# Patient Record
Sex: MALE | Race: WHITE | HISPANIC OR LATINO | Employment: OTHER | URBAN - METROPOLITAN AREA
[De-identification: names, ages, dates, MRNs, and addresses within clinical notes are randomized per-mention and may not be internally consistent; named-entity substitution may affect disease eponyms.]

---

## 2023-04-27 ENCOUNTER — HOSPITAL ENCOUNTER (OUTPATIENT)
Dept: DATA CONVERSION | Facility: HOSPITAL | Age: 68
End: 2023-04-27
Attending: STUDENT IN AN ORGANIZED HEALTH CARE EDUCATION/TRAINING PROGRAM | Admitting: STUDENT IN AN ORGANIZED HEALTH CARE EDUCATION/TRAINING PROGRAM
Payer: MEDICARE

## 2023-04-27 DIAGNOSIS — E78.5 HYPERLIPIDEMIA, UNSPECIFIED: ICD-10-CM

## 2023-04-27 DIAGNOSIS — R97.20 ELEVATED PROSTATE SPECIFIC ANTIGEN (PSA): ICD-10-CM

## 2023-04-27 DIAGNOSIS — Z86.16 PERSONAL HISTORY OF COVID-19: ICD-10-CM

## 2023-04-27 DIAGNOSIS — I10 ESSENTIAL (PRIMARY) HYPERTENSION: ICD-10-CM

## 2023-04-27 DIAGNOSIS — R80.9 PROTEINURIA, UNSPECIFIED: ICD-10-CM

## 2023-04-27 DIAGNOSIS — E66.9 OBESITY, UNSPECIFIED: ICD-10-CM

## 2023-04-27 DIAGNOSIS — N40.1 BENIGN PROSTATIC HYPERPLASIA WITH LOWER URINARY TRACT SYMPTOMS: ICD-10-CM

## 2023-04-27 DIAGNOSIS — R35.0 FREQUENCY OF MICTURITION: ICD-10-CM

## 2023-04-27 DIAGNOSIS — N52.9 MALE ERECTILE DYSFUNCTION, UNSPECIFIED: ICD-10-CM

## 2023-04-27 DIAGNOSIS — F41.9 ANXIETY DISORDER, UNSPECIFIED: ICD-10-CM

## 2023-04-27 DIAGNOSIS — C61 MALIGNANT NEOPLASM OF PROSTATE (MULTI): ICD-10-CM

## 2023-05-01 ENCOUNTER — OFFICE VISIT (OUTPATIENT)
Dept: PRIMARY CARE | Facility: CLINIC | Age: 68
End: 2023-05-01
Payer: MEDICARE

## 2023-05-01 VITALS
SYSTOLIC BLOOD PRESSURE: 140 MMHG | HEIGHT: 68 IN | HEART RATE: 97 BPM | WEIGHT: 217 LBS | BODY MASS INDEX: 32.89 KG/M2 | DIASTOLIC BLOOD PRESSURE: 78 MMHG | OXYGEN SATURATION: 97 % | TEMPERATURE: 97.8 F

## 2023-05-01 DIAGNOSIS — I10 PRIMARY HYPERTENSION: Primary | ICD-10-CM

## 2023-05-01 DIAGNOSIS — L85.3 DRY SKIN: ICD-10-CM

## 2023-05-01 DIAGNOSIS — N40.1 BENIGN PROSTATIC HYPERPLASIA WITH URINARY FREQUENCY: ICD-10-CM

## 2023-05-01 DIAGNOSIS — R35.0 BENIGN PROSTATIC HYPERPLASIA WITH URINARY FREQUENCY: ICD-10-CM

## 2023-05-01 DIAGNOSIS — Z12.11 ENCOUNTER FOR SCREENING FOR MALIGNANT NEOPLASM OF COLON: ICD-10-CM

## 2023-05-01 PROCEDURE — 3008F BODY MASS INDEX DOCD: CPT | Performed by: FAMILY MEDICINE

## 2023-05-01 PROCEDURE — 3078F DIAST BP <80 MM HG: CPT | Performed by: FAMILY MEDICINE

## 2023-05-01 PROCEDURE — 99387 INIT PM E/M NEW PAT 65+ YRS: CPT | Performed by: FAMILY MEDICINE

## 2023-05-01 PROCEDURE — 3077F SYST BP >= 140 MM HG: CPT | Performed by: FAMILY MEDICINE

## 2023-05-01 PROCEDURE — 1036F TOBACCO NON-USER: CPT | Performed by: FAMILY MEDICINE

## 2023-05-01 RX ORDER — LOSARTAN POTASSIUM 100 MG/1
100 TABLET ORAL DAILY
COMMUNITY
Start: 2020-12-04 | End: 2023-11-01 | Stop reason: SDUPTHER

## 2023-05-01 RX ORDER — PREDNISONE 20 MG/1
40 TABLET ORAL DAILY PRN
Qty: 20 TABLET | Refills: 0 | Status: SHIPPED | OUTPATIENT
Start: 2023-05-01 | End: 2023-05-06

## 2023-05-01 RX ORDER — TADALAFIL 20 MG/1
20 TABLET ORAL DAILY PRN
COMMUNITY
Start: 2017-05-24 | End: 2023-11-01 | Stop reason: SDUPTHER

## 2023-05-01 RX ORDER — TAMSULOSIN HYDROCHLORIDE 0.4 MG/1
0.4 CAPSULE ORAL DAILY
Qty: 90 CAPSULE | Refills: 1 | Status: SHIPPED | OUTPATIENT
Start: 2023-05-01 | End: 2023-11-01 | Stop reason: SDUPTHER

## 2023-05-01 RX ORDER — DRESSING,ODOR ABSORBENT,H-POLY 4" X 4"
1 BANDAGE MISCELLANEOUS DAILY
Qty: 30 EACH | Refills: 2 | Status: SHIPPED
Start: 2023-05-01 | End: 2023-11-01 | Stop reason: ALTCHOICE

## 2023-05-01 RX ORDER — TAMSULOSIN HYDROCHLORIDE 0.4 MG/1
0.4 CAPSULE ORAL DAILY
COMMUNITY
Start: 2020-04-29 | End: 2023-11-01 | Stop reason: SDUPTHER

## 2023-05-01 RX ORDER — BUTYROSPERMUM PARKII(SHEA BUTTER), SIMMONDSIA CHINENSIS (JOJOBA) SEED OIL, ALOE BARBADENSIS LEAF EXTRACT .01; 1; 3.5 G/100G; G/100G; G/100G
250 LIQUID TOPICAL 2 TIMES DAILY
COMMUNITY

## 2023-05-01 RX ORDER — LOSARTAN POTASSIUM 100 MG/1
100 TABLET ORAL DAILY
Qty: 90 TABLET | Refills: 1 | Status: SHIPPED
Start: 2023-05-01 | End: 2023-06-21 | Stop reason: ALTCHOICE

## 2023-05-01 NOTE — PROGRESS NOTES
Subjective   Patient ID: Chacho Vences is a 68 y.o. male who presents for New Patient Visit and Annual Exam.  HPI     Current Outpatient Medications on File Prior to Visit   Medication Sig Dispense Refill    losartan (Cozaar) 100 mg tablet Take 1 tablet (100 mg) by mouth once daily.      saccharomyces boulardii (Florastor) 250 mg capsule Take 1 capsule (250 mg) by mouth 2 times a day.      tamsulosin (Flomax) 0.4 mg 24 hr capsule Take 1 capsule (0.4 mg) by mouth once daily.      tadalafil (Cialis) 20 mg tablet Take 1 tablet (20 mg) by mouth once daily as needed for erectile dysfunction.       No current facility-administered medications on file prior to visit.        Vitals:    05/01/23 1442   BP: 140/78   Pulse: 97   Temp: 36.6 °C (97.8 °F)   SpO2: 97%       Review of Systems    Objective     Physical Exam    Legacy Encounter on 03/09/2023   Component Date Value Ref Range Status    Glucose 03/09/2023 104 (H)  74 - 99 mg/dL Final    Sodium 03/09/2023 139  136 - 145 mmol/L Final    Potassium 03/09/2023 3.8  3.5 - 5.3 mmol/L Final    Chloride 03/09/2023 106  98 - 107 mmol/L Final    Bicarbonate 03/09/2023 26  21 - 32 mmol/L Final    Anion Gap 03/09/2023 11  10 - 20 mmol/L Final    Urea Nitrogen 03/09/2023 13  6 - 23 mg/dL Final    Creatinine 03/09/2023 1.07  0.50 - 1.30 mg/dL Final    GFR MALE 03/09/2023 76  >90 mL/min/1.73m2 Final    Comment:  CALCULATIONS OF ESTIMATED GFR ARE PERFORMED   USING THE 2021 CKD-EPI STUDY REFIT EQUATION   WITHOUT THE RACE VARIABLE FOR THE IDMS-TRACEABLE   CREATININE METHODS.    https://jasn.asnjournals.org/content/early/2021/09/22/ASN.8376442539      Calcium 03/09/2023 8.9  8.6 - 10.3 mg/dL Final    WBC 03/09/2023 6.4  4.4 - 11.3 x10E9/L Final    RBC 03/09/2023 4.66  4.50 - 5.90 x10E12/L Final    Hemoglobin 03/09/2023 14.7  13.5 - 17.5 g/dL Final    Hematocrit 03/09/2023 42.4  41.0 - 52.0 % Final    MCV 03/09/2023 91  80 - 100 fL Final    MCHC 03/09/2023 34.7  32.0 - 36.0 g/dL Final     Platelets 03/09/2023 154  150 - 450 x10E9/L Final    RDW 03/09/2023 13.2  11.5 - 14.5 % Final    Pathology Report 03/09/2023    Final                    Value:Name GENE CHEATHAM                                                                                                   Accession #: [id number]            Pathologist:                   THANG BARNETT DO  Date of Procedure:    3/9/2023  Date Received:          3/9/2023  Date Reported           3/17/2023  Submitting Physician:   AGUEDA ROSENTHAL  Location:                    [facility]  Other External #     Procedures/Addenda Present                                                               FINAL DIAGNOSIS  A.  PROSTATE, RIGHT BASE, BIOPSY:    - PROSTATIC ADENOCARCINOMA, LISA SCORE 6 (3+3), GRADE GROUP 1, INVOLVING    1 OF 1 CORES AND APPROXIMATELY 5% OF SUBMITTED TISSUE    B.  PROSTATE, RIGHT LATERAL BASE, BIOPSY:  - HIGH-GRADE PROSTATIC INTRAEPITHELIAL NEOPLASIA    C.  PROSTATE, RIGHT MID, BIOPSY:    - ATYPICAL SMALL ACINAR PROLIFERATION, SUSPICIOUS FOR ADENOCARCINOMA    D.  PROSTATE, RIGHT LATERAL MID, BIOPSY:    - BENIGN PROSTATIC TISSUE    E.  PROSTATE, RIGHT APEX, BIOPSY:                              - BENIGN PROSTATIC TISSUE    F.  PROSTATE, RIGHT LATERAL APEX, BIOPSY:    - BENIGN PROSTATIC TISSUE    G.  PROSTATE, LEFT BASE, BIOPSY:    - PROSTATIC ADENOCARCINOMA, LISA SCORE 7 (3+4), GRADE GROUP 2, INVOLVING    1 OF 1 CORES AND APPROXIMATELY 35% OF SUBMITTED TISSUE    - PERCENTAGE OF LISA PATTERN 4: 5%  - POSITIVE FOR PERINEURAL INVASION    H.  PROSTATE, LEFT LATERAL BASE, BIOPSY:    - PROSTATIC ADENOCARCINOMA, LISA SCORE 6 (3+3), GRADE GROUP 1, INVOLVING    1 OF 1 CORES AND APPROXIMATELY 10% OF SUBMITTED TISSUE  - POSITIVE FOR PERINEURAL INVASION    I.  PROSTATE, LEFT MID, BIOPSY:    - PROSTATIC ADENOCARCINOMA, LISA SCORE 6 (3+3), GRADE GROUP 1, INVOLVING    1 OF 1 CORES AND APPROXIMATELY 40% OF SUBMITTED TISSUE  - POSITIVE FOR  PERINEURAL INVASION    J.  PROSTATE, LEFT LATERAL MID, BIOPSY:   - PROSTATIC ADENOCARCINOMA, LISA SCORE 6 (3+3), GRADE GROUP 1, INVOLVING    1 OF 1 CORES AND APPROXIMATELY 40% OF SUBMITTED TISSUE     K.  PROSTATE, LEFT APEX, BIOPSY:    - UT                          OSTATIC ADENOCARCINOMA, LISA SCORE 6 (3+3), GRADE GROUP 1, INVOLVING    1 OF 1 CORES AND APPROXIMATELY LESS THAN 5% OF SUBMITTED TISSUE    L.  PROSTATE, LEFT LATERAL APEX, BIOPSY:    - PROSTATIC ADENOCARCINOMA, LISA SCORE 7 (3+4), GRADE GROUP 2, INVOLVING    1 OF 1 CORES AND APPROXIMATELY 20% OF SUBMITTED TISSUE    - PERCENTAGE OF LISA PATTERN 4: 15%    M.  PROSTATE, REGION OF INTEREST #1, BIOPSY:    - PROSTATIC ADENOCARCINOMA, LISA SCORE 7 (3+4), GRADE GROUP 2, INVOLVING    3 OF 3 CORES AND APPROXIMATELY 40% OF SUBMITTED TISSUE    - PERCENTAGE OF LISA PATTERN 4: 20%    Note: Immunostain cocktail PIN4 is consistent with the above diagnosis.    N.  PROSTATE, REGION OF INTEREST #2, BIOPSY:    - PROSTATIC ADENOCARCINOMA, LISA SCORE 6 (3+3), GRADE GROUP 1, INVOLVING    2 OF 4 CORES AND APPROXIMATELY 5% OF SUBMITTED TISSUE    Comment A-N: No intraductal carcinoma or cribriform pattern identified.                                                                                                                                                                                                                                                                                                                                                                                                                                                                                                                 Electronically Signed Out By THANG BARNETT DO/EFRAIN  By the signature on this report, the individual or group listed as making the  Final Interpretation/Diagnosis certifies that they have reviewed this case.  Diagnostic interpretation performed at [facility]  [facility] [address] [place] [address]           Addendum/Procedures:  Special Oncology Report With Image     Date Ordered:     4/20/2023     Status:   Signed Out       Date Complete:     4/20/2023             Date Reported:     4/20/2023                  Addendum Diagnosis  A complete  Decipher Genomic Risk test result issued by ([employer]) is on file in the Department of Anatomic Pathology at [facility].    Genomic Risk is: LOW           Electronically Signed Out By THANG BARNETT DO/EFRAIN  By the signature on this report, the individual or group listed as making the  Final Interpretation/Diagnosis certifies that they have reviewed this case.  Addendum signed out at [facility] [address] [place] [address].  Outside Special Oncology Report  [IMAGE:attachments:c6:1]         Clinical History:  Physician Contact Number: [id number]  Fixative (A): Formalin  Site Loc (B): Lateral  Fixative (B): Formalin  Site Loc (C): Medial  Fixative (C): Formalin  Site Loc (D): Lateral  Fixative (D): Formalin  Fixative (E): Formalin  Site Loc (F): Lateral  Fixative (F): Formalin  Fixative (G): Formalin  Site Loc (H): Lateral  Fixative (H): Formalin  Fixative (I): Formalin  Fixative (J): Formalin  Fixative (K): Formalin  Fixative (L): Formalin  Fixative (M): Formalin  Fixative (                          N): Formalin  Ischemic Time (A): 07:40  Ischemic Time (B): 07:40  Ischemic Time (C): 07:40  Fixative Time (A): 07:40  Fixative Time (B): 07:40  Fixative Time (C): 07:40  Ischemic Time (D): 07:41  Fixative Time (D): 07:41  Ischemic Time (E): 07:41  Fixative Time (E): 07:42  Ischemic Time (F): 07:42  Fixative Time (F): 07:42  Ischemic Time (G): 07:42  Fixative Time (G): 07:43  Ischemic Time (H): 07:43  Fixative Time (H): 07:43  Ischemic Time (I): 07:43  Fixative Time (I): 07:43  Ischemic Time (J): 07:43  Fixative Time (J): 07:43  Ischemic Time (K):  "07:43  Fixative Time (K): 07:43  Ischemic Time (L): 07:44  Fixative Time (L): 07:44  Ischemic Time (M): 07:44  Fixative Time (M): 07:44  Ischemic Time (N): 07:44  Fixative Time (N): 07:44  Clinical Diagnosis History Patient presents with above. Here today for prostate  biopsy.    Specimens Submitted As:  A: RIGHT BASE   B: RIGHT LATERAL BASE   C: RIGHT MID   D: RIGHT LATERAL MID   E: RIGHT APEX   F: RIGHT LATERAL APEX   G: LEFTBASE   H: LEFT L                          ATERAL BASE   I: LEFT MID   J: LEFT LATERAL MID   K: LEFT APEX   L: LEFT LATERAL APEX   M: REGION OF INTEREST #1   N: REGION OF INTEREST #2     Gross Description:  A:  Received in formalin, labeled with the patient's name and hospital number  and \"right base\", is one cylindrical segment of tan soft tissue measuring 1.5  cm in length by less than 0.1 cm in diameter. The specimen is submitted in toto  in one cassette.   MKM    B:  Received in formalin, labeled with the patient's name and hospital number  and \"right lateral base\", is one cylindrical segment of tan soft tissue  measuring 1.0 cm in length by less than 0.1 cm in diameter. The specimen is  submitted in toto in one cassette.   MKM    C:  Received in formalin, labeled with the patient's name and hospital number  and \"right mid\", is one cylindrical segment of tan soft tissue measuring 1.6 cm  in length by less than 0.1 cm in diameter. The specimen is submitted in toto in  one cassette.   MKM    D:  Received in formalin, labeled                           with the patient's name and hospital number  and \"right lateral mid\", is one cylindrical segment of tan soft tissue  measuring 1.4 cm in length by less than 0.1 cm in diameter. The specimen is  submitted in toto in one cassette.   MKM    E:  Received in formalin, labeled with the patient's name and hospital number  and \"right apex\", is one cylindrical segment of tan soft tissue measuring 1.6  cm in length by less than 0.1 cm in diameter. The " "specimen is submitted in toto  in one cassette.   MKM    F:  Received in formalin, labeled with the patient's name and hospital number  and \"right lateral apex\", is one cylindrical segment of tan soft tissue  measuring 1.3 cm in length by less than 0.1 cm in diameter. The specimen is  submitted in toto in one cassette.   MKM    G:  Received in formalin, labeled with the patient's name and hospital number  and \"left base\", is one cylindrical segment of tan soft tissue measuring 1.5 cm  in length by less than 0.1 cm in diameter. The specimen is submi                          tted in toto in  one cassette.   MKM    H:  Received in formalin, labeled with the patient's name and hospital number  and \"left lateral base\", is one cylindrical segment of tan soft tissue  measuring 1.3 cm in length by less than 0.1 cm in diameter. The specimen is  submitted in toto in one cassette.   MKM    I:  Received in formalin, labeled with the patient's name and hospital number  and \"left mid\", is one cylindrical segment of tan soft tissue measuring 1.4 cm  in length by less than 0.1 cm in diameter. The specimen is submitted in toto in  one cassette.   MKM    J:  Received in formalin, labeled with the patient's name and hospital number  and \"left lateral mid\", is one cylindrical segment of tan soft tissue measuring  1.3 cm in length by less than 0.1 cm in diameter. The specimen is submitted in  toto in one cassette.   MKM    K:  Received in formalin, labeled with the patient's name and hospital number  and \"left apex\", is one cylindrical segment of tan soft tissue measuring 1.                          3 cm  in length by less than 0.1 cm in diameter. The specimen is submitted in toto in  one cassette.   MKM    L:  Received in formalin, labeled with the patient's name and hospital number  and \"left lateral apex\", is one cylindrical segment of tan soft tissue,  measuring, 1.4 cm in length by less than 0.1 cm in diameter. The specimen " "is  submitted in toto in one cassette.   MKM    M: Received in formalin, labeled with the patient's name and hospital number  and \"region of interest 1\", are multiple cylindrical segments of tan soft  tissue ranging from 1.4 cm to 1.6 cm in length by less than 0.1 cm in diameter.  The specimen is submitted in toto in one cassette.   MKM    N: Received in formalin, labeled with the patient's name and hospital number  and \"region of interest 2\", are multiple cylindrical segments of tan soft  tissue ranging from 0.6 cm to 1.3 cm in length by less than 0.1 cm in diameter.  The specimen is submitted in toto in one cassette.   [facility]/3/10/2023           The                           assays/tests were performed with appropriate positive and negative controls  which stained appropriately.    [facility]  Department of Pathology   [address] [place] [address]        Ventricular Rate 03/09/2023 67  BPM Final    Atrial Rate 03/09/2023 67  BPM Final    CT Interval 03/09/2023 157  ms Final    QRS Duration 03/09/2023 90  ms Final    QT Interval 03/09/2023 417  ms Final    QTC Calculation(Bazett) 03/09/2023 441  ms Final    P Axis 03/09/2023 38  degrees Final    R Axis 03/09/2023 6  degrees Final    T Axis 03/09/2023 27  degrees Final    QRS Count 03/09/2023 10  beats Final    Q Onset 03/09/2023 249  ms Final    T Offset 03/09/2023 458  ms Final    QTC Fredericia 03/09/2023 432  ms Final       Assessment/Plan     "

## 2023-05-09 LAB — NONINV COLON CA DNA+OCC BLD SCRN STL QL: NORMAL

## 2023-05-24 ENCOUNTER — TELEPHONE (OUTPATIENT)
Dept: PRIMARY CARE | Facility: CLINIC | Age: 68
End: 2023-05-24
Payer: MEDICARE

## 2023-05-24 LAB — NONINV COLON CA DNA+OCC BLD SCRN STL QL: NEGATIVE

## 2023-06-20 ENCOUNTER — TELEPHONE (OUTPATIENT)
Dept: PRIMARY CARE | Facility: CLINIC | Age: 68
End: 2023-06-20
Payer: MEDICARE

## 2023-06-20 DIAGNOSIS — I10 PRIMARY HYPERTENSION: Primary | ICD-10-CM

## 2023-06-20 NOTE — TELEPHONE ENCOUNTER
Patient is going through Radiation for Prostate CA his BP has been up 188/88,168/74,181/84,175/79,176/74 is taking Losartan 100 mg should he take 1 in the morning and 1 at night? Or what should he do?

## 2023-06-21 RX ORDER — HYDROCHLOROTHIAZIDE 25 MG/1
25 TABLET ORAL DAILY
Qty: 90 TABLET | Refills: 1 | Status: SHIPPED | OUTPATIENT
Start: 2023-06-21 | End: 2023-11-01 | Stop reason: SDUPTHER

## 2023-09-07 VITALS — HEIGHT: 68 IN | BODY MASS INDEX: 31.41 KG/M2 | WEIGHT: 207.23 LBS

## 2023-09-14 NOTE — H&P
History & Physical Reviewed:   I have reviewed the History and Physical dated:  29-Mar-2023   History and Physical reviewed and relevant findings noted. Patient examined to review pertinent physical  findings.: No significant changes   Home Medications Reviewed: no changes noted   Allergies Reviewed: no changes noted       ERAS (Enhanced Recovery After Surgery):  ·  ERAS Patient: no     Consent:   COVID-19 Consent:  ·  COVID-19 Risk Consent Surgeon has reviewed key risks related to the risk of elina COVID-19 and if they contract COVID-19 what the risks are.       Electronic Signatures:  Sergio Herring)  (Signed 27-Apr-2023 08:15)   Authored: History & Physical Reviewed, ERAS, Consent,  Note Completion      Last Updated: 27-Apr-2023 08:15 by Sergio Herring)

## 2023-10-02 NOTE — OP NOTE
PROCEDURE DETAILS    Preoperative Diagnosis:  Malignant neoplasm prostate, C61    Postoperative Diagnosis:  Malignant neoplasm prostate, C61    Surgeon: Sergio Herring  Resident/Fellow/Other Assistant: None of these were associated with this case    Procedure:  1. SPACEOAR AND FIDUCIALS    Anesthesia: No anesthesiologist associated with this case  Estimated Blood Loss: 0  Findings: 24.6 g prostate  SpaceOAR Julian gel placement  Fiducials placed left posterior apex, left anterior base, right mid gland  Specimens(s) Collected: no,     Complications: none immediate   Drains and/or Catheters: none  Implants: SpaceOAR JULIAN  Gold fiducial x3        Operative Report:   Patient was met in the preoperative holding area where informed consent was obtained.  Brought to the operative suite where MAC anesthesia was given.  Placed  in high dorsolithotomy position and prepped in standard surgical fashion.  Timeout performed and all parties in agreement.  Transrectal ultrasound probe inserted after digital rectal exam confirmed small prostate with no obvious nodule.  There were noninflamed  external hemorrhoids.  I placed gold fiducials under ultrasound guidance left anterior base, left posterior apex, right mid gland.  Then assembled SpaceOAR Julian kit and hydrodissected plane between anterior Denonvilliers and rectal wall with saline after  aspirating to confirm I was not intravascular.  10 mL space review injected into the space slowly over 20 seconds with good tissue separation under ultrasound guidance.  Needle was removed and procedure was terminated.  Patient taken recovery in stable  condition.    Plan:  Patient follow-up with radiation oncology for treatment planning                        Attestation:   Note Completion:  Attending Attestation I performed the procedure without a resident         Electronic Signatures:  Sergio Herring)  (Signed 27-Apr-2023 09:00)   Authored: Post-Operative Note, Chart Review, Note  Completion      Last Updated: 27-Apr-2023 09:00 by Sergio Herring)

## 2023-10-05 ENCOUNTER — LAB (OUTPATIENT)
Dept: LAB | Facility: LAB | Age: 68
End: 2023-10-05
Payer: MEDICARE

## 2023-10-05 DIAGNOSIS — I10 PRIMARY HYPERTENSION: ICD-10-CM

## 2023-10-05 DIAGNOSIS — C61 MALIGNANT NEOPLASM OF PROSTATE (MULTI): ICD-10-CM

## 2023-10-05 DIAGNOSIS — C61 MALIGNANT NEOPLASM OF PROSTATE (MULTI): Primary | ICD-10-CM

## 2023-10-05 LAB
ALBUMIN SERPL BCP-MCNC: 4.6 G/DL (ref 3.4–5)
ALP SERPL-CCNC: 70 U/L (ref 33–136)
ALT SERPL W P-5'-P-CCNC: 15 U/L (ref 10–52)
ANION GAP SERPL CALC-SCNC: 13 MMOL/L (ref 10–20)
AST SERPL W P-5'-P-CCNC: 18 U/L (ref 9–39)
BILIRUB SERPL-MCNC: 0.6 MG/DL (ref 0–1.2)
BUN SERPL-MCNC: 19 MG/DL (ref 6–23)
CALCIUM SERPL-MCNC: 9.6 MG/DL (ref 8.6–10.6)
CHLORIDE SERPL-SCNC: 103 MMOL/L (ref 98–107)
CHOLEST SERPL-MCNC: 172 MG/DL (ref 0–199)
CHOLESTEROL/HDL RATIO: 4.7
CO2 SERPL-SCNC: 29 MMOL/L (ref 21–32)
CREAT SERPL-MCNC: 1.09 MG/DL (ref 0.5–1.3)
GFR SERPL CREATININE-BSD FRML MDRD: 74 ML/MIN/1.73M*2
GLUCOSE SERPL-MCNC: 84 MG/DL (ref 74–99)
HDLC SERPL-MCNC: 36.4 MG/DL
LDLC SERPL CALC-MCNC: 112 MG/DL (ref 140–190)
NON HDL CHOLESTEROL: 136 MG/DL (ref 0–149)
POTASSIUM SERPL-SCNC: 3.9 MMOL/L (ref 3.5–5.3)
PROT SERPL-MCNC: 7.5 G/DL (ref 6.4–8.2)
PSA SERPL-MCNC: 0.44 NG/ML
SODIUM SERPL-SCNC: 141 MMOL/L (ref 136–145)
TRIGL SERPL-MCNC: 118 MG/DL (ref 0–149)
VLDL: 24 MG/DL (ref 0–40)

## 2023-10-05 PROCEDURE — 80053 COMPREHEN METABOLIC PANEL: CPT

## 2023-10-05 PROCEDURE — 36415 COLL VENOUS BLD VENIPUNCTURE: CPT

## 2023-10-05 PROCEDURE — 84153 ASSAY OF PSA TOTAL: CPT

## 2023-10-05 PROCEDURE — 80061 LIPID PANEL: CPT

## 2023-11-01 ENCOUNTER — OFFICE VISIT (OUTPATIENT)
Dept: PRIMARY CARE | Facility: CLINIC | Age: 68
End: 2023-11-01
Payer: MEDICARE

## 2023-11-01 VITALS
HEART RATE: 77 BPM | BODY MASS INDEX: 31.07 KG/M2 | OXYGEN SATURATION: 96 % | WEIGHT: 205 LBS | HEIGHT: 68 IN | DIASTOLIC BLOOD PRESSURE: 70 MMHG | TEMPERATURE: 97.3 F | SYSTOLIC BLOOD PRESSURE: 138 MMHG

## 2023-11-01 DIAGNOSIS — R35.0 BENIGN PROSTATIC HYPERPLASIA WITH URINARY FREQUENCY: ICD-10-CM

## 2023-11-01 DIAGNOSIS — N40.1 BENIGN PROSTATIC HYPERPLASIA WITH URINARY FREQUENCY: ICD-10-CM

## 2023-11-01 DIAGNOSIS — I10 PRIMARY HYPERTENSION: ICD-10-CM

## 2023-11-01 DIAGNOSIS — Z00.00 ROUTINE GENERAL MEDICAL EXAMINATION AT HEALTH CARE FACILITY: Primary | ICD-10-CM

## 2023-11-01 DIAGNOSIS — N52.9 ERECTILE DYSFUNCTION, UNSPECIFIED ERECTILE DYSFUNCTION TYPE: ICD-10-CM

## 2023-11-01 PROCEDURE — 3078F DIAST BP <80 MM HG: CPT | Performed by: FAMILY MEDICINE

## 2023-11-01 PROCEDURE — 1170F FXNL STATUS ASSESSED: CPT | Performed by: FAMILY MEDICINE

## 2023-11-01 PROCEDURE — 1159F MED LIST DOCD IN RCRD: CPT | Performed by: FAMILY MEDICINE

## 2023-11-01 PROCEDURE — 3008F BODY MASS INDEX DOCD: CPT | Performed by: FAMILY MEDICINE

## 2023-11-01 PROCEDURE — 1036F TOBACCO NON-USER: CPT | Performed by: FAMILY MEDICINE

## 2023-11-01 PROCEDURE — 3075F SYST BP GE 130 - 139MM HG: CPT | Performed by: FAMILY MEDICINE

## 2023-11-01 PROCEDURE — 99214 OFFICE O/P EST MOD 30 MIN: CPT | Performed by: FAMILY MEDICINE

## 2023-11-01 PROCEDURE — 1160F RVW MEDS BY RX/DR IN RCRD: CPT | Performed by: FAMILY MEDICINE

## 2023-11-01 PROCEDURE — G0439 PPPS, SUBSEQ VISIT: HCPCS | Performed by: FAMILY MEDICINE

## 2023-11-01 RX ORDER — TAMSULOSIN HYDROCHLORIDE 0.4 MG/1
0.4 CAPSULE ORAL DAILY
Qty: 90 CAPSULE | Refills: 2 | Status: SHIPPED
Start: 2023-11-01 | End: 2024-05-06 | Stop reason: SDUPTHER

## 2023-11-01 RX ORDER — TADALAFIL 20 MG/1
20 TABLET ORAL DAILY PRN
Qty: 30 TABLET | Refills: 1 | Status: SHIPPED | OUTPATIENT
Start: 2023-11-01 | End: 2024-01-29 | Stop reason: SDUPTHER

## 2023-11-01 RX ORDER — LOSARTAN POTASSIUM 100 MG/1
100 TABLET ORAL DAILY
Qty: 90 TABLET | Refills: 2 | Status: SHIPPED
Start: 2023-11-01 | End: 2024-05-06 | Stop reason: ALTCHOICE

## 2023-11-01 RX ORDER — HYDROCHLOROTHIAZIDE 25 MG/1
25 TABLET ORAL DAILY
Qty: 90 TABLET | Refills: 1 | Status: SHIPPED
Start: 2023-11-01 | End: 2024-05-06 | Stop reason: ALTCHOICE

## 2023-11-01 ASSESSMENT — ACTIVITIES OF DAILY LIVING (ADL)
DOING_HOUSEWORK: INDEPENDENT
TAKING_MEDICATION: INDEPENDENT
GROCERY_SHOPPING: NEEDS ASSISTANCE
DRESSING: INDEPENDENT
BATHING: INDEPENDENT
MANAGING_FINANCES: INDEPENDENT

## 2023-11-01 ASSESSMENT — PATIENT HEALTH QUESTIONNAIRE - PHQ9
2. FEELING DOWN, DEPRESSED OR HOPELESS: NOT AT ALL
1. LITTLE INTEREST OR PLEASURE IN DOING THINGS: NOT AT ALL
SUM OF ALL RESPONSES TO PHQ9 QUESTIONS 1 AND 2: 0

## 2023-11-01 NOTE — PROGRESS NOTES
"Subjective   Reason for Visit: Chacho Vences is an 68 y.o. male here for a Medicare Wellness visit.     Past Medical, Surgical, and Family History reviewed and updated in chart.    Reviewed all medications by prescribing practitioner or clinical pharmacist (such as prescriptions, OTCs, herbal therapies and supplements) and documented in the medical record.    HPI  HTN: well controlled  ED: needs refills of Cialis  Prostate CA: doing well after radiation.  PSA dropped significantly  BPH: doing well on flomax    Preparing meals - independent  Using telephone - independent  Bladder - continent  Bowel - continent    Recent hospital stays - none    ADLs - able to dress, walk and bath independently  Instrumental ADL's - able to shop, maintain finances, managing medications, housekeeping independently    Depression: PHQ-2 (screening 2 mins) - negative    Opioid use -   none  Exercise -  stays active  Diet - well balanced  Pain score - minimal    Providers    ANTWON dementia screen - given to pt    Education - educated pt on healthy eating, exercise, weight loss    Falls - no falls      Counseled pt on living will - has living will    AAA screening:  NA     Prostate CA screen:  treated for prostate CA    CV screening: CA score in 2018    Colonoscopy:  done cologuard in 23, due for 2026    Diabetes screening:  neg    Glaucoma screen:  recommended optho follow up    CT chest tob screen: NA    Vaccines:  got flu, doesn't plan to get COVID booster.  Had pneumonia shot.      Patient Care Team:  Farhat Jones MD as PCP - General (Family Medicine)     Review of Systems   All other systems reviewed and are negative.      Objective   Vitals:  /70   Pulse 77   Temp 36.3 °C (97.3 °F)   Ht 1.721 m (5' 7.75\")   Wt 93 kg (205 lb)   SpO2 96%   BMI 31.40 kg/m²       Physical Exam  Vitals reviewed.   Constitutional:       General: He is not in acute distress.     Appearance: Normal appearance. He is well-developed. He is " not diaphoretic.   HENT:      Head: Normocephalic and atraumatic.      Right Ear: Tympanic membrane normal.      Left Ear: Tympanic membrane normal.      Nose: Nose normal.      Mouth/Throat:      Mouth: Mucous membranes are moist.   Eyes:      Pupils: Pupils are equal, round, and reactive to light.   Cardiovascular:      Rate and Rhythm: Normal rate and regular rhythm.      Heart sounds: Normal heart sounds. No murmur heard.     No friction rub. No gallop.   Pulmonary:      Effort: Pulmonary effort is normal.      Breath sounds: Normal breath sounds. No rales.   Abdominal:      General: Bowel sounds are normal.      Palpations: Abdomen is soft.      Tenderness: There is no abdominal tenderness.   Musculoskeletal:      Cervical back: Normal range of motion and neck supple.   Skin:     General: Skin is warm and dry.   Neurological:      Mental Status: He is alert.   Psychiatric:         Mood and Affect: Mood normal.         Assessment/Plan   Problem List Items Addressed This Visit    None  Visit Diagnoses       Routine general medical examination at health care facility    -  Primary    Benign prostatic hyperplasia with urinary frequency        Relevant Medications    losartan (Cozaar) 100 mg tablet    tamsulosin (Flomax) 0.4 mg 24 hr capsule    Primary hypertension        Relevant Medications    hydroCHLOROthiazide (HYDRODiuril) 25 mg tablet    Erectile dysfunction, unspecified erectile dysfunction type        Relevant Medications    tadalafil (Cialis) 20 mg tablet          Patient is doing well.  Refilled pts meds.  Follow up in 6 mo.

## 2023-12-19 DIAGNOSIS — N40.1 BENIGN PROSTATIC HYPERPLASIA WITH URINARY FREQUENCY: ICD-10-CM

## 2023-12-19 DIAGNOSIS — R35.0 BENIGN PROSTATIC HYPERPLASIA WITH URINARY FREQUENCY: ICD-10-CM

## 2023-12-19 RX ORDER — LOSARTAN POTASSIUM 100 MG/1
100 TABLET ORAL DAILY
Qty: 90 TABLET | Refills: 2 | OUTPATIENT
Start: 2023-12-19

## 2023-12-28 ENCOUNTER — LAB (OUTPATIENT)
Dept: LAB | Facility: LAB | Age: 68
End: 2023-12-28
Payer: MEDICARE

## 2023-12-28 DIAGNOSIS — C61 MALIGNANT NEOPLASM OF PROSTATE (MULTI): ICD-10-CM

## 2023-12-28 DIAGNOSIS — C61 MALIGNANT NEOPLASM OF PROSTATE (MULTI): Primary | ICD-10-CM

## 2023-12-28 LAB — PSA SERPL-MCNC: 0.38 NG/ML

## 2023-12-28 PROCEDURE — 84153 ASSAY OF PSA TOTAL: CPT

## 2023-12-28 PROCEDURE — 36415 COLL VENOUS BLD VENIPUNCTURE: CPT

## 2024-01-24 ENCOUNTER — HOSPITAL ENCOUNTER (OUTPATIENT)
Dept: RADIATION ONCOLOGY | Facility: CLINIC | Age: 69
Setting detail: RADIATION/ONCOLOGY SERIES
Discharge: HOME | End: 2024-01-24
Payer: MEDICARE

## 2024-01-24 VITALS
TEMPERATURE: 97.2 F | DIASTOLIC BLOOD PRESSURE: 79 MMHG | HEART RATE: 66 BPM | OXYGEN SATURATION: 97 % | BODY MASS INDEX: 31.14 KG/M2 | WEIGHT: 203.3 LBS | SYSTOLIC BLOOD PRESSURE: 160 MMHG | RESPIRATION RATE: 16 BRPM

## 2024-01-24 DIAGNOSIS — C61 PROSTATE CANCER (MULTI): Primary | ICD-10-CM

## 2024-01-24 PROCEDURE — 99214 OFFICE O/P EST MOD 30 MIN: CPT | Performed by: STUDENT IN AN ORGANIZED HEALTH CARE EDUCATION/TRAINING PROGRAM

## 2024-01-24 ASSESSMENT — COLUMBIA-SUICIDE SEVERITY RATING SCALE - C-SSRS
2. HAVE YOU ACTUALLY HAD ANY THOUGHTS OF KILLING YOURSELF?: NO
1. IN THE PAST MONTH, HAVE YOU WISHED YOU WERE DEAD OR WISHED YOU COULD GO TO SLEEP AND NOT WAKE UP?: NO
6. HAVE YOU EVER DONE ANYTHING, STARTED TO DO ANYTHING, OR PREPARED TO DO ANYTHING TO END YOUR LIFE?: NO

## 2024-01-24 ASSESSMENT — PAIN SCALES - GENERAL: PAINLEVEL: 0-NO PAIN

## 2024-01-24 ASSESSMENT — LIFESTYLE VARIABLES
SKIP TO QUESTIONS 9-10: 1
AUDIT-C TOTAL SCORE: 0
HOW OFTEN DO YOU HAVE SIX OR MORE DRINKS ON ONE OCCASION: NEVER
HOW OFTEN DO YOU HAVE A DRINK CONTAINING ALCOHOL: NEVER
HOW MANY STANDARD DRINKS CONTAINING ALCOHOL DO YOU HAVE ON A TYPICAL DAY: PATIENT DOES NOT DRINK

## 2024-01-24 ASSESSMENT — ENCOUNTER SYMPTOMS
LOSS OF SENSATION IN FEET: 0
OCCASIONAL FEELINGS OF UNSTEADINESS: 0
DEPRESSION: 0

## 2024-01-24 ASSESSMENT — PATIENT HEALTH QUESTIONNAIRE - PHQ9
SUM OF ALL RESPONSES TO PHQ9 QUESTIONS 1 & 2: 0
1. LITTLE INTEREST OR PLEASURE IN DOING THINGS: NOT AT ALL
2. FEELING DOWN, DEPRESSED OR HOPELESS: NOT AT ALL

## 2024-01-24 NOTE — PROGRESS NOTES
Radiation Oncology Follow-Up    Patient Name:  Chacho Vences  MRN:  [id number]  :  1955    Referring Provider: No ref. provider found  Primary Care Provider: Farhat Jones MD  Care Team: Patient Care Team:  Farhat Jones MD as PCP - General (Family Medicine)    Date of Service: 2024     SUBJECTIVE  History of Present Illness:   Chacho Vences is a 68 y.o. male who was previously seen at the [facility] Department of Radiation Oncology for unfavorable intermediate risk prostate cancer, [id number], [place]  3+4=7 (9/14 cores involved), pretreatment PSA 5.54 ng/mL. He received definitive prostate SBRT 37.5 Gy/5 fractions completed 23. He had SpaceOAR hydrogel and fiducials placed prior to treatment. He declined ADT.     His last PSA on 23 was 0.38 ng/mL.      Lab Results   Component Value Date    PSA 0.38 2023    PSA 0.44 10/05/2023    PSA 5.54 (H) 2023      Today, he feels well overall. He does feel some fatigue compared to before radiation, but he is exercising and playing pickleball regularly. He endorses some increased frequency of urination and slightly weaker stream since finishing radiation, and he takes Flomax 0.4 mg in the morning. He has nocturia once per night. He does drink about 100 oz of water during the day as part of a diet change. He denies dysuria, hematuria, loose stools, blood in his stool, or unexplained weight loss. He has had some right hip pain and pain in his right great toe recently that may be from arthritis.    Treatment Rendered:   Prostate SBRT 37.5 Gy/5 fractions completed 23      Review of Systems:   Review of Systems   All other systems reviewed and are negative.      Performance Status:   The Karnofsky performance scale today is 90, Able to carry on normal activity; minor signs or symptoms of disease (ECOG equivalent 0).        OBJECTIVE  Vital Signs:  /79 (BP Location: Left arm, Patient Position:  Sitting, BP Cuff Size: Adult)   Pulse 66   Temp 36.2 °C (97.2 °F) (Temporal)   Resp 16   Wt 92.2 kg (203 lb 4.8 oz)   SpO2 97%   BMI 31.14 kg/m²    Physical Exam:  Physical Exam  Vitals reviewed.   Constitutional:       General: He is not in acute distress.     Appearance: Normal appearance.   HENT:      Head: Normocephalic and atraumatic.      Mouth/Throat:      Mouth: Mucous membranes are moist.      Pharynx: No oropharyngeal exudate or posterior oropharyngeal erythema.   Eyes:      General: No scleral icterus.     Extraocular Movements: Extraocular movements intact.      Conjunctiva/sclera: Conjunctivae normal.      Pupils: Pupils are equal, round, and reactive to light.   Pulmonary:      Effort: Pulmonary effort is normal. No respiratory distress.   Musculoskeletal:         General: Normal range of motion.      Cervical back: Normal range of motion and neck supple.      Right lower leg: No edema.      Left lower leg: No edema.   Skin:     General: Skin is warm and dry.      Findings: No lesion or rash.   Neurological:      General: No focal deficit present.      Mental Status: He is alert and oriented to person, place, and time.      Cranial Nerves: No cranial nerve deficit.      Sensory: No sensory deficit.      Motor: No weakness.      Coordination: Coordination normal.      Gait: Gait normal.   Psychiatric:         Mood and Affect: Mood normal.         Behavior: Behavior normal.             ASSESSMENT:  Chacho Vences is a 68 y.o. male with unfavorable intermediate risk prostate cancer, [id number], [place]  3+4=7 (9/14 cores involved), pretreatment PSA 5.54 ng/mL. He received definitive prostate SBRT 37.5 Gy/5 fractions completed 6/23/23. He had SpaceOAR hydrogel and fiducials placed prior to treatment. He declined ADT.     His PSA has dropped appropriately to 0.38 ng/mL on 12/23/23. He feels well overall despite some increase in urinary frequency (although he is drinking a large amount of water  during the day), and he maintains excellent performance status (ECOG 0).    I will check PSA in about 6 months and see him for follow up afterwards.    NCCN Guidelines were applicable to guide this patients treatment plan.    Geovany Avery MD

## 2024-01-29 DIAGNOSIS — N52.9 ERECTILE DYSFUNCTION, UNSPECIFIED ERECTILE DYSFUNCTION TYPE: ICD-10-CM

## 2024-01-29 RX ORDER — TADALAFIL 20 MG/1
20 TABLET ORAL DAILY PRN
Qty: 30 TABLET | Refills: 1 | Status: SHIPPED | OUTPATIENT
Start: 2024-01-29 | End: 2024-05-06 | Stop reason: SDUPTHER

## 2024-01-29 NOTE — PROGRESS NOTES
Subjective   Patient ID: Chacho Vences is a 68 y.o. male who presents for No chief complaint on file..  HPI    Patient Active Problem List   Diagnosis    Prostate cancer (CMS/[facility])       Social Connections: Not on file       Current Outpatient Medications on File Prior to Visit   Medication Sig Dispense Refill    hydroCHLOROthiazide (HYDRODiuril) 25 mg tablet Take 1 tablet (25 mg) by mouth once daily. 90 tablet 1    losartan (Cozaar) 100 mg tablet Take 1 tablet (100 mg) by mouth once daily. 90 tablet 2    saccharomyces boulardii (Florastor) 250 mg capsule Take 1 capsule (250 mg) by mouth 2 times a day.      tadalafil (Cialis) 20 mg tablet Take 1 tablet (20 mg) by mouth once daily as needed for erectile dysfunction. 30 tablet 1    tamsulosin (Flomax) 0.4 mg 24 hr capsule Take 1 capsule (0.4 mg) by mouth once daily. 90 capsule 2     No current facility-administered medications on file prior to visit.        There were no vitals filed for this visit.  There were no vitals filed for this visit.    Review of Systems    Objective     Physical Exam    Lab on 12/28/2023   Component Date Value Ref Range Status    Prostate Specific AG 12/28/2023 0.38  <=4.00 ng/mL Final       Assessment/Plan

## 2024-04-29 ASSESSMENT — ENCOUNTER SYMPTOMS
HYPERTENSION: 1
ORTHOPNEA: 0
BLURRED VISION: 0
PND: 0
SHORTNESS OF BREATH: 0
SWEATS: 0
HEADACHES: 0
NECK PAIN: 0
PALPITATIONS: 0

## 2024-05-06 ENCOUNTER — OFFICE VISIT (OUTPATIENT)
Dept: PRIMARY CARE | Facility: CLINIC | Age: 69
End: 2024-05-06
Payer: MEDICARE

## 2024-05-06 VITALS
DIASTOLIC BLOOD PRESSURE: 64 MMHG | BODY MASS INDEX: 31.55 KG/M2 | WEIGHT: 206 LBS | OXYGEN SATURATION: 96 % | SYSTOLIC BLOOD PRESSURE: 154 MMHG | TEMPERATURE: 97.5 F | HEART RATE: 76 BPM

## 2024-05-06 DIAGNOSIS — G89.29 CHRONIC LEFT SHOULDER PAIN: Primary | ICD-10-CM

## 2024-05-06 DIAGNOSIS — I10 PRIMARY HYPERTENSION: ICD-10-CM

## 2024-05-06 DIAGNOSIS — R35.0 BENIGN PROSTATIC HYPERPLASIA WITH URINARY FREQUENCY: ICD-10-CM

## 2024-05-06 DIAGNOSIS — N52.9 ERECTILE DYSFUNCTION, UNSPECIFIED ERECTILE DYSFUNCTION TYPE: ICD-10-CM

## 2024-05-06 DIAGNOSIS — M25.512 CHRONIC LEFT SHOULDER PAIN: Primary | ICD-10-CM

## 2024-05-06 DIAGNOSIS — N40.1 BENIGN PROSTATIC HYPERPLASIA WITH URINARY FREQUENCY: ICD-10-CM

## 2024-05-06 PROBLEM — N40.0 BPH (BENIGN PROSTATIC HYPERPLASIA): Status: ACTIVE | Noted: 2024-05-06

## 2024-05-06 PROBLEM — E78.5 HYPERLIPIDEMIA: Status: ACTIVE | Noted: 2023-04-27

## 2024-05-06 PROCEDURE — 3078F DIAST BP <80 MM HG: CPT | Performed by: FAMILY MEDICINE

## 2024-05-06 PROCEDURE — 1036F TOBACCO NON-USER: CPT | Performed by: FAMILY MEDICINE

## 2024-05-06 PROCEDURE — 1159F MED LIST DOCD IN RCRD: CPT | Performed by: FAMILY MEDICINE

## 2024-05-06 PROCEDURE — 3077F SYST BP >= 140 MM HG: CPT | Performed by: FAMILY MEDICINE

## 2024-05-06 PROCEDURE — 99214 OFFICE O/P EST MOD 30 MIN: CPT | Performed by: FAMILY MEDICINE

## 2024-05-06 PROCEDURE — 20610 DRAIN/INJ JOINT/BURSA W/O US: CPT | Performed by: FAMILY MEDICINE

## 2024-05-06 RX ORDER — TAMSULOSIN HYDROCHLORIDE 0.4 MG/1
0.4 CAPSULE ORAL 2 TIMES DAILY
Qty: 180 CAPSULE | Refills: 2 | Status: SHIPPED | OUTPATIENT
Start: 2024-05-06 | End: 2025-01-31

## 2024-05-06 RX ORDER — LOSARTAN POTASSIUM AND HYDROCHLOROTHIAZIDE 25; 100 MG/1; MG/1
1 TABLET ORAL DAILY
Qty: 90 TABLET | Refills: 2 | Status: SHIPPED | OUTPATIENT
Start: 2024-05-06 | End: 2025-01-31

## 2024-05-06 RX ORDER — TADALAFIL 20 MG/1
20 TABLET ORAL DAILY PRN
Qty: 30 TABLET | Refills: 1 | Status: SHIPPED | OUTPATIENT
Start: 2024-05-06

## 2024-05-06 RX ORDER — TRIAMCINOLONE ACETONIDE 40 MG/ML
40 INJECTION, SUSPENSION INTRA-ARTICULAR; INTRAMUSCULAR ONCE
Status: COMPLETED | OUTPATIENT
Start: 2024-05-06 | End: 2024-05-06

## 2024-05-06 RX ORDER — LIDOCAINE HYDROCHLORIDE 20 MG/ML
2 INJECTION, SOLUTION INFILTRATION; PERINEURAL ONCE
Status: COMPLETED | OUTPATIENT
Start: 2024-05-06 | End: 2024-05-06

## 2024-05-06 RX ADMIN — TRIAMCINOLONE ACETONIDE 40 MG: 40 INJECTION, SUSPENSION INTRA-ARTICULAR; INTRAMUSCULAR at 08:32

## 2024-05-06 RX ADMIN — LIDOCAINE HYDROCHLORIDE 2 ML: 20 INJECTION, SOLUTION INFILTRATION; PERINEURAL at 08:31

## 2024-05-06 ASSESSMENT — ENCOUNTER SYMPTOMS
NECK PAIN: 0
PALPITATIONS: 0
BLURRED VISION: 0
SHORTNESS OF BREATH: 0
PND: 0
HYPERTENSION: 1
SWEATS: 0
ORTHOPNEA: 0
HEADACHES: 0

## 2024-05-06 NOTE — PROGRESS NOTES
Subjective   Patient ID: Chacho Vences is a 69 y.o. male who presents for Hypertension and Benign Prostatic Hypertrophy.  Hypertension  This is a recurrent problem. The current episode started more than 1 year ago. The problem has been gradually improving since onset. The problem is controlled. Associated symptoms include malaise/fatigue. Pertinent negatives include no anxiety, blurred vision, chest pain, headaches, neck pain, orthopnea, palpitations, peripheral edema, PND, shortness of breath or sweats.   Benign Prostatic Hypertrophy    HTN: well controlled  ED: needs refills of Cialis  Prostate CA: doing well after radiation.  PSA dropped significantly  BPH: doing well on flomax    Patient Active Problem List   Diagnosis    Prostate cancer (Multi)    Hyperlipidemia    BPH (benign prostatic hyperplasia)    Hypertension       Social Connections: Not on file       Current Outpatient Medications on File Prior to Visit   Medication Sig Dispense Refill    losartan (Cozaar) 100 mg tablet Take 1 tablet (100 mg) by mouth once daily. 90 tablet 2    saccharomyces boulardii (Florastor) 250 mg capsule Take 1 capsule (250 mg) by mouth 2 times a day.      tadalafil (Cialis) 20 mg tablet Take 1 tablet (20 mg) by mouth once daily as needed for erectile dysfunction. 30 tablet 1    tamsulosin (Flomax) 0.4 mg 24 hr capsule Take 1 capsule (0.4 mg) by mouth once daily. (Patient taking differently: Take 1 capsule (0.4 mg) by mouth 2 times a day.) 90 capsule 2    hydroCHLOROthiazide (HYDRODiuril) 25 mg tablet Take 1 tablet (25 mg) by mouth once daily. (Patient not taking: Reported on 5/6/2024) 90 tablet 1     No current facility-administered medications on file prior to visit.        Vitals:    05/06/24 0801   BP: 154/64   Pulse: 76   Temp: 36.4 °C (97.5 °F)   SpO2: 96%     Vitals:    05/06/24 0801   Weight: 93.4 kg (206 lb)       Review of Systems   Constitutional:  Positive for malaise/fatigue.   Eyes:  Negative for blurred  vision.   Respiratory:  Negative for shortness of breath.    Cardiovascular:  Negative for chest pain, palpitations, orthopnea and PND.   Musculoskeletal:  Negative for neck pain.   Neurological:  Negative for headaches.       Objective     Physical Exam  Vitals reviewed.   Constitutional:       General: He is not in acute distress.     Appearance: Normal appearance. He is well-developed. He is not diaphoretic.   HENT:      Head: Normocephalic and atraumatic.      Right Ear: Tympanic membrane normal.      Left Ear: Tympanic membrane normal.      Nose: Nose normal.      Mouth/Throat:      Mouth: Mucous membranes are moist.   Eyes:      Pupils: Pupils are equal, round, and reactive to light.   Cardiovascular:      Rate and Rhythm: Normal rate and regular rhythm.      Heart sounds: Normal heart sounds. No murmur heard.     No friction rub. No gallop.   Pulmonary:      Effort: Pulmonary effort is normal.      Breath sounds: Normal breath sounds. No rales.   Abdominal:      General: Bowel sounds are normal.      Palpations: Abdomen is soft.      Tenderness: There is no abdominal tenderness.   Musculoskeletal:      Cervical back: Normal range of motion and neck supple.   Skin:     General: Skin is warm and dry.   Neurological:      Mental Status: He is alert.   Psychiatric:         Mood and Affect: Mood normal.         No visits with results within 2 Month(s) from this visit.   Latest known visit with results is:   Lab on 12/28/2023   Component Date Value Ref Range Status    Prostate Specific AG 12/28/2023 0.38  <=4.00 ng/mL Final       Assessment/Plan   Problem List Items Addressed This Visit             ICD-10-CM    BPH (benign prostatic hyperplasia) N40.0    Relevant Medications    tamsulosin (Flomax) 0.4 mg 24 hr capsule    Hypertension I10    Relevant Medications    losartan-hydrochlorothiazide (Hyzaar) 100-25 mg tablet    Other Relevant Orders    Lipid Panel    Comprehensive Metabolic Panel    CBC and Auto  Differential     Other Visit Diagnoses         Codes    Chronic left shoulder pain    -  Primary M25.512, G89.29    Relevant Medications    triamcinolone acetonide (Kenalog-40) injection 40 mg (Completed) (Start on 5/6/2024  8:45 AM)    lidocaine (Xylocaine) 20 mg/mL (2 %) injection 2 mL (Completed) (Start on 5/6/2024  8:45 AM)    Erectile dysfunction, unspecified erectile dysfunction type     N52.9    Relevant Medications    tadalafil (Cialis) 20 mg tablet          Obtained verbal consent from patient.  Cleaned area with alcohol swab.  Injected L shoulder from (POST) position with 1.5 ml lidocaine without epi and 1.5 ml kenalog.  There was minimal bleeding.  Patient had immediate improvement of symptoms.    If shoulder pain is not improving will refer patient to Ortho shoulder.  Otherwise patient is doing well.  Refilled patient medication.  Follow-up in 6 months.

## 2024-05-17 ENCOUNTER — E-VISIT (OUTPATIENT)
Dept: PRIMARY CARE | Facility: CLINIC | Age: 69
End: 2024-05-17
Payer: MEDICARE

## 2024-05-17 DIAGNOSIS — G89.29 CHRONIC LEFT SHOULDER PAIN: Primary | ICD-10-CM

## 2024-05-17 DIAGNOSIS — M25.512 CHRONIC LEFT SHOULDER PAIN: Primary | ICD-10-CM

## 2024-05-17 DIAGNOSIS — I10 PRIMARY HYPERTENSION: Primary | ICD-10-CM

## 2024-05-17 RX ORDER — AMLODIPINE BESYLATE 10 MG/1
10 TABLET ORAL DAILY
Qty: 90 TABLET | Refills: 1 | Status: SHIPPED | OUTPATIENT
Start: 2024-05-17 | End: 2024-11-13

## 2024-05-28 ENCOUNTER — OFFICE VISIT (OUTPATIENT)
Dept: PRIMARY CARE | Facility: CLINIC | Age: 69
End: 2024-05-28
Payer: MEDICARE

## 2024-05-28 ENCOUNTER — TELEPHONE (OUTPATIENT)
Dept: PRIMARY CARE | Facility: CLINIC | Age: 69
End: 2024-05-28

## 2024-05-28 VITALS
WEIGHT: 205 LBS | DIASTOLIC BLOOD PRESSURE: 70 MMHG | BODY MASS INDEX: 31.4 KG/M2 | HEART RATE: 84 BPM | TEMPERATURE: 97.1 F | SYSTOLIC BLOOD PRESSURE: 110 MMHG | OXYGEN SATURATION: 97 %

## 2024-05-28 DIAGNOSIS — R05.1 ACUTE COUGH: ICD-10-CM

## 2024-05-28 DIAGNOSIS — J01.90 ACUTE SINUSITIS, RECURRENCE NOT SPECIFIED, UNSPECIFIED LOCATION: Primary | ICD-10-CM

## 2024-05-28 DIAGNOSIS — S20.361A TICK BITE OF RIGHT FRONT WALL OF THORAX, INITIAL ENCOUNTER: ICD-10-CM
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
## 2024-05-28 PROCEDURE — 3078F DIAST BP <80 MM HG: CPT | Performed by: PHYSICIAN ASSISTANT

## 2024-05-28 PROCEDURE — 1160F RVW MEDS BY RX/DR IN RCRD: CPT | Performed by: PHYSICIAN ASSISTANT

## 2024-05-28 PROCEDURE — 87635 SARS-COV-2 COVID-19 AMP PRB: CPT

## 2024-05-28 PROCEDURE — 3074F SYST BP LT 130 MM HG: CPT | Performed by: PHYSICIAN ASSISTANT

## 2024-05-28 PROCEDURE — 1159F MED LIST DOCD IN RCRD: CPT | Performed by: PHYSICIAN ASSISTANT

## 2024-05-28 PROCEDURE — 1036F TOBACCO NON-USER: CPT | Performed by: PHYSICIAN ASSISTANT

## 2024-05-28 PROCEDURE — 99214 OFFICE O/P EST MOD 30 MIN: CPT | Performed by: PHYSICIAN ASSISTANT

## 2024-05-28 RX ORDER — DOXYCYCLINE 100 MG/1
100 CAPSULE ORAL 2 TIMES DAILY
Qty: 20 CAPSULE | Refills: 0 | Status: SHIPPED | OUTPATIENT
Start: 2024-05-28 | End: 2024-06-07

## 2024-05-28 ASSESSMENT — ENCOUNTER SYMPTOMS
FEVER: 0
COUGH: 1
CHILLS: 0

## 2024-05-28 NOTE — TELEPHONE ENCOUNTER
----- Message from Katia Lee MA sent at 5/28/2024  7:26 AM EDT -----  Regarding: FW: Tick bite  Contact: [phone]  Pt needs seen, thanks. AM  ----- Message -----  From: Chacho Vences  Sent: 5/25/2024  11:32 AM EDT  To: #  Subject: Tick bite                                        I discovered a tick on my chest and I was able to dig it out. I got the entire body and the head. I have attached a picture of the tick   Is there anything else that I should be doing?

## 2024-05-28 NOTE — PROGRESS NOTES
Subjective   Patient ID: Chacho Vences is a 69 y.o. male who presents for Tick Removal (Tick bite on chest x 1 week), Sore Throat, and Nasal Congestion (X 1 week).    HPI   Patient c/o tick bite that he found on chest 5/19. Removed tick the same day. Wound is healed. No surrounding redness. No fevers or rashes or body aches.     Also complains of cough, nasal discharge or sinus pain. Denies fever, chills, aches, shortness of breath and sore throat. Scratchy throat, first thing in the morning especially.   Present for 8 days. Has worsened since onset.   Cough: Cough is productive of greenish sputum.   Nasal discharge: Described as purulent.   Sinus pain: Experiencing frontal pain.   Denies associated diarrhea, earache and vomiting.     Taking OTC Dayquil and Nyquil.     Wife was sick prior to him.   Patient denies recent known COVID exposure or international travel.        reports that he has never smoked. He has never used smokeless tobacco.    Review of Systems   Constitutional:  Negative for chills and fever.   HENT:  Positive for congestion.    Respiratory:  Positive for cough.        Objective   /70   Pulse 84   Temp 36.2 °C (97.1 °F)   Wt 93 kg (205 lb)   SpO2 97%   BMI 31.40 kg/m²     Physical Exam  Vitals and nursing note reviewed.   Constitutional:       Appearance: Normal appearance.   HENT:      Head: Normocephalic.      Right Ear: Tympanic membrane and ear canal normal.      Left Ear: Tympanic membrane and ear canal normal.      Nose: Congestion present.      Right Sinus: Frontal sinus tenderness present.      Left Sinus: Frontal sinus tenderness present.      Mouth/Throat:      Mouth: Mucous membranes are moist.      Pharynx: No oropharyngeal exudate or posterior oropharyngeal erythema.      Comments: Has PND.  Eyes:      General: No scleral icterus.  Cardiovascular:      Rate and Rhythm: Normal rate and regular rhythm.   Pulmonary:      Effort: Pulmonary effort is normal.      Breath  sounds: Normal breath sounds.   Lymphadenopathy:      Cervical: No cervical adenopathy.   Skin:     General: Skin is warm and dry.      Comments: No wound remaining in the area of the tick bite on his right upper chest.  No surrounding erythema or swelling.   Neurological:      Mental Status: He is alert.         Assessment/Plan   Diagnoses and all orders for this visit:  Acute sinusitis, recurrence not specified, unspecified location  -     doxycycline (Vibramycin) 100 mg capsule; Take 1 capsule (100 mg) by mouth 2 times a day for 10 days.  -     Sars-CoV-2 PCR; Future  Acute cough  -     Sars-CoV-2 PCR; Future  Tick bite of right front wall of thorax, initial encounter       Send swab for COVID test.   Rx doxycycline 100mg bid x 10 days.   Can use Mucinex DM.   Tick bite seems low risk and appears more consistent with dog tick based on photo he showed me on his phone. Also tick wasn't engorged and believes he removed it the same day. Regardless, will be on Doxycycline for sinusitis illness as well, which will prophylactically cover him for lyme.  Encourage fluids and rest.   Follow up if symptoms increase or persist.

## 2024-05-29 LAB — SARS-COV-2 RNA RESP QL NAA+PROBE: NOT DETECTED

## 2024-05-30 ENCOUNTER — OFFICE VISIT (OUTPATIENT)
Dept: ORTHOPEDIC SURGERY | Facility: HOSPITAL | Age: 69
End: 2024-05-30
Payer: MEDICARE

## 2024-05-30 ENCOUNTER — HOSPITAL ENCOUNTER (OUTPATIENT)
Dept: RADIOLOGY | Facility: HOSPITAL | Age: 69
Discharge: HOME | End: 2024-05-30
Payer: MEDICARE

## 2024-05-30 VITALS — HEIGHT: 68 IN | WEIGHT: 198 LBS | BODY MASS INDEX: 30.01 KG/M2

## 2024-05-30 DIAGNOSIS — M25.512 LEFT SHOULDER PAIN, UNSPECIFIED CHRONICITY: ICD-10-CM

## 2024-05-30 DIAGNOSIS — M19.012 OSTEOARTHRITIS OF LEFT SHOULDER, UNSPECIFIED OSTEOARTHRITIS TYPE: Primary | ICD-10-CM

## 2024-05-30 DIAGNOSIS — M25.512 CHRONIC LEFT SHOULDER PAIN: ICD-10-CM

## 2024-05-30 DIAGNOSIS — G89.29 CHRONIC LEFT SHOULDER PAIN: ICD-10-CM

## 2024-05-30 DIAGNOSIS — M19.012 OSTEOARTHRITIS OF GLENOHUMERAL JOINT, LEFT: ICD-10-CM

## 2024-05-30 DIAGNOSIS — M75.22 BICEPS TENDINITIS, LEFT: ICD-10-CM

## 2024-05-30 PROCEDURE — 1160F RVW MEDS BY RX/DR IN RCRD: CPT | Performed by: STUDENT IN AN ORGANIZED HEALTH CARE EDUCATION/TRAINING PROGRAM

## 2024-05-30 PROCEDURE — 73030 X-RAY EXAM OF SHOULDER: CPT | Mod: LEFT SIDE | Performed by: RADIOLOGY

## 2024-05-30 PROCEDURE — 73030 X-RAY EXAM OF SHOULDER: CPT | Mod: LT

## 2024-05-30 PROCEDURE — 1125F AMNT PAIN NOTED PAIN PRSNT: CPT | Performed by: STUDENT IN AN ORGANIZED HEALTH CARE EDUCATION/TRAINING PROGRAM

## 2024-05-30 PROCEDURE — 1159F MED LIST DOCD IN RCRD: CPT | Performed by: STUDENT IN AN ORGANIZED HEALTH CARE EDUCATION/TRAINING PROGRAM

## 2024-05-30 PROCEDURE — 1036F TOBACCO NON-USER: CPT | Performed by: STUDENT IN AN ORGANIZED HEALTH CARE EDUCATION/TRAINING PROGRAM

## 2024-05-30 PROCEDURE — 99214 OFFICE O/P EST MOD 30 MIN: CPT | Performed by: STUDENT IN AN ORGANIZED HEALTH CARE EDUCATION/TRAINING PROGRAM

## 2024-05-30 PROCEDURE — 99204 OFFICE O/P NEW MOD 45 MIN: CPT | Performed by: STUDENT IN AN ORGANIZED HEALTH CARE EDUCATION/TRAINING PROGRAM

## 2024-05-30 ASSESSMENT — PAIN - FUNCTIONAL ASSESSMENT: PAIN_FUNCTIONAL_ASSESSMENT: 0-10

## 2024-05-30 ASSESSMENT — PAIN SCALES - GENERAL: PAINLEVEL_OUTOF10: 4

## 2024-05-30 NOTE — PROGRESS NOTES
PRIMARY CARE PHYSICIAN: Farhat Jones MD  REFERRING PROVIDER: Farhat Jones MD  [id number]   [place] [phone]     CONSULT ORTHOPAEDIC: Shoulder Evaluation    ASSESSMENT & PLAN    Impression: 69 y.o. male with advanced left glenohumeral osteoarthritis    Plan:   I explained to the patient the nature of their diagnosis.  I reviewed their imaging studies with them.    Based on the history, physical exam and imaging studies above, the patient's presentation is consistent with the above diagnosis.  I had a long discussion with the patient regarding their presentation and the treatment options.  We discussed initial nonoperative versus operative management options as well as potential further diagnostic imaging. I reviewed the x-ray images with him which demonstrated severe bone-on-bone glenohumeral osteoarthritis of the left shoulder. We discussed both operative and non-operative management for this. Patient has failed conservative treatment thus far including activity modification, rest ice and corticosteroid injections. He has persistent pain and dysfunction of the left shoulder now affecting ADL. I do believe patient would benefit from a left anatomic total shoulder arthroplasty. I thoroughly explained the risks and benefits as well as the expected postoperative timeline for the proposed procedure versus non operative management. Risks of procedure include but are not limited to bleeding, infection, nerve injury, DVT, persistent pain, and failure of repair or implant. The patient expressed understanding and wished to think further about this. He will discuss with his family and call our office if he wishes to proceed with surgical intervention at which time we will begin the pre surgical process and find them a surgical date in a timely fashion that works for them.    If he elects to proceed he will require a CT blueprint of the left shoulder for preoperative planning.    At the end of the  visit, all questions were answered in full. The patient is in agreement with the plan and recommendations. They will call the office with any questions/concerns.    Note dictated with [employer] software. Completed without full typed error editing and sent to avoid delay.       SUBJECTIVE  CHIEF COMPLAINT:   Chief Complaint   Patient presents with    Left Shoulder - Pain        HPI: Chacho Vences is a 69 y.o. patient who presents today with left shoulder pain. XR today. Chacho has had this shoulder pain for approximately 2 years. No injury to the left shoulder. He localizes his pain deep to the shoulder. He feels he has and continues to lose range of motion. He has been going to a chiropractor and has received injections for this shoulder pain. Most recent injection done by his PCP on 05/06 which did not provide him any relief. Golf significantly worsens his shoulder pain. His pain and dysfunction now affect ADL.     They deny any associated neck pain.  No numbness, tingling, or paresthesias.    REVIEW OF SYSTEMS  Constitutional: See HPI for pain assessment, No significant weight loss, recent trauma  Cardiovascular: No chest pain, shortness of breath  Respiratory: No difficulty breathing, cough  Gastrointestinal: No nausea, vomiting, diarrhea, constipation  Musculoskeletal: Noted in HPI, positive for pain, restricted motion, stiffness  Integumentary: No rashes, easy bruising, redness   Neurological: no numbness or tingling in extremities, no gait disturbances   Psychiatric: No mood changes, memory changes, social issues  Heme/Lymph: no excessive swelling, easy bruising, excessive bleeding  ENT: No hearing changes  Eyes: No vision changes    Past Medical History:   Diagnosis Date    Abnormal finding of blood chemistry, unspecified 10/08/2015    Abnormal blood chemistry        Allergies   Allergen Reactions    Lisinopril Cough        Past Surgical History:   Procedure Laterality Date    OTHER  SURGICAL HISTORY  07/20/2022    Inguinal hernia repair    OTHER SURGICAL HISTORY  07/20/2022    Nasal septoplasty    OTHER SURGICAL HISTORY  07/20/2022    Tonsillectomy        No family history on file.     Social History     Socioeconomic History    Marital status:      Spouse name: Not on file    Number of children: Not on file    Years of education: Not on file    Highest education level: Not on file   Occupational History    Not on file   Tobacco Use    Smoking status: Never    Smokeless tobacco: Never   Substance and Sexual Activity    Alcohol use: Not on file    Drug use: Not on file    Sexual activity: Not on file   Other Topics Concern    Not on file   Social History Narrative    Not on file     Social Determinants of Health     Financial Resource Strain: Not on file   Food Insecurity: Not on file   Transportation Needs: Not on file   Physical Activity: Not on file   Stress: Not on file   Social Connections: Not on file   Intimate Partner Violence: Not on file   Housing Stability: Not on file        CURRENT MEDICATIONS:   Current Outpatient Medications   Medication Sig Dispense Refill    amLODIPine (Norvasc) 10 mg tablet Take 1 tablet (10 mg) by mouth once daily. 90 tablet 1    doxycycline (Vibramycin) 100 mg capsule Take 1 capsule (100 mg) by mouth 2 times a day for 10 days. 20 capsule 0    losartan-hydrochlorothiazide (Hyzaar) 100-25 mg tablet Take 1 tablet by mouth once daily. 90 tablet 2    saccharomyces boulardii (Florastor) 250 mg capsule Take 1 capsule (250 mg) by mouth 2 times a day.      tadalafil (Cialis) 20 mg tablet Take 1 tablet (20 mg) by mouth once daily as needed for erectile dysfunction. 30 tablet 1    tamsulosin (Flomax) 0.4 mg 24 hr capsule Take 1 capsule (0.4 mg) by mouth 2 times a day. 180 capsule 2     No current facility-administered medications for this visit.        OBJECTIVE    PHYSICAL EXAM      3/29/2023     2:25 PM 4/27/2023     7:18 AM 5/1/2023     2:42 PM 11/1/2023      "8:49 AM 1/24/2024    10:39 AM 5/6/2024     8:01 AM 5/28/2024     2:51 PM   Vitals   Systolic 156  140 138 160 154 110   Diastolic 77  78 70 79 64 70   Heart Rate 73  97 77 66 76 84   Temp 36.3 °C (97.3 °F)  36.6 °C (97.8 °F) 36.3 °C (97.3 °F) 36.2 °C (97.2 °F) 36.4 °C (97.5 °F) 36.2 °C (97.1 °F)   Resp 18    16     Height (in) 1.73 m (5' 8.11\") 1.725 m (5' 7.91\") 1.715 m (5' 7.5\") 1.721 m (5' 7.75\")      Weight (lb) 208.34 207.23 217 205 203.3 206 205   BMI 31.57 kg/m2 31.59 kg/m2 33.49 kg/m2 31.4 kg/m2 31.14 kg/m2 31.55 kg/m2 31.4 kg/m2   BSA (m2) 2.13 m2 2.12 m2 2.16 m2 2.11 m2 2.1 m2 2.11 m2 2.11 m2   Visit Report   Report Report  Report Report      There is no height or weight on file to calculate BMI.    GENERAL: A/Ox3, NAD. Appears healthy, well nourished  PSYCHIATRIC: Mood stable, appropriate memory recall  EYES: EOM intact, no scleral icterus  CARDIOVASCULAR: Palpable peripheral pulses  LUNGS: Breathing non-labored on room air  SKIN: no erythema, rashes, or ecchymosis     MUSCULOSKELETAL:  Laterality: left Shoulder Exam  - Negative Spurlings, full painless neck ROM  - Skin intact  - No erythema or warmth  - No ecchymosis or soft tissue swelling  - Shoulder ROM:  forward flexion 130, ER 40, IR lower lumbar  - Strength:      Jobes 5/5     ER 5/5     Belly press and bearhug 5/5  - Palpation: TTP anterior and posterior joint line  - Millan and Neers positive  - Speeds and O'Briens positive    NEUROVASCULAR:  - Neurovascular Status: sensation intact to light touch distally, upper extremity motor grossly intact  - Capillary refill brisk at extremities, Bilateral peripheral pulses 2+    Imaging: Multiple views of the affected left shoulder(s) demonstrate: no acute osseous abnormality, no fracture; advanced degenerative changes of the glenohumeral joint.   X-rays were personally reviewed and interpreted by me.  Radiology reports were reviewed by me as well, if readily available at the time.      Jose Velez, " MD  Attending Surgeon    Sports Medicine Orthopaedic Surgery  [facility] [place]  [facility] [employer]

## 2024-06-10 ENCOUNTER — TELEPHONE (OUTPATIENT)
Dept: ORTHOPEDIC SURGERY | Facility: CLINIC | Age: 69
End: 2024-06-10
Payer: MEDICARE

## 2024-06-10 NOTE — TELEPHONE ENCOUNTER
Please submit case request for 7/16/24 for left shoulder    The calendar is updated and postop scheduled.   Surgery checklist sent via email

## 2024-06-11 DIAGNOSIS — M19.012 OSTEOARTHRITIS OF LEFT SHOULDER, UNSPECIFIED OSTEOARTHRITIS TYPE: ICD-10-CM

## 2024-06-11 PROBLEM — M75.22 BICEPS TENDINITIS, LEFT: Status: ACTIVE | Noted: 2024-05-30

## 2024-06-13 ENCOUNTER — HOSPITAL ENCOUNTER (OUTPATIENT)
Dept: RADIOLOGY | Facility: CLINIC | Age: 69
Discharge: HOME | End: 2024-06-13
Payer: MEDICARE

## 2024-06-13 ENCOUNTER — LAB (OUTPATIENT)
Dept: LAB | Facility: LAB | Age: 69
End: 2024-06-13
Payer: MEDICARE

## 2024-06-13 DIAGNOSIS — D64.9 ANEMIA, UNSPECIFIED TYPE: ICD-10-CM

## 2024-06-13 DIAGNOSIS — M19.012 OSTEOARTHRITIS OF LEFT SHOULDER, UNSPECIFIED OSTEOARTHRITIS TYPE: ICD-10-CM

## 2024-06-13 DIAGNOSIS — C61 PROSTATE CANCER (MULTI): ICD-10-CM

## 2024-06-13 DIAGNOSIS — I10 PRIMARY HYPERTENSION: ICD-10-CM

## 2024-06-13 LAB
ALBUMIN SERPL BCP-MCNC: 3.8 G/DL (ref 3.4–5)
ALP SERPL-CCNC: 75 U/L (ref 33–136)
ALT SERPL W P-5'-P-CCNC: 15 U/L (ref 10–52)
ANION GAP SERPL CALC-SCNC: 13 MMOL/L (ref 10–20)
AST SERPL W P-5'-P-CCNC: 17 U/L (ref 9–39)
BASOPHILS # BLD AUTO: 0.03 X10*3/UL (ref 0–0.1)
BASOPHILS NFR BLD AUTO: 0.6 %
BILIRUB SERPL-MCNC: 0.4 MG/DL (ref 0–1.2)
BUN SERPL-MCNC: 16 MG/DL (ref 6–23)
CALCIUM SERPL-MCNC: 9 MG/DL (ref 8.6–10.6)
CHLORIDE SERPL-SCNC: 101 MMOL/L (ref 98–107)
CHOLEST SERPL-MCNC: 151 MG/DL (ref 0–199)
CHOLESTEROL/HDL RATIO: 4.4
CO2 SERPL-SCNC: 29 MMOL/L (ref 21–32)
CREAT SERPL-MCNC: 0.98 MG/DL (ref 0.5–1.3)
EGFRCR SERPLBLD CKD-EPI 2021: 83 ML/MIN/1.73M*2
EOSINOPHIL # BLD AUTO: 0.04 X10*3/UL (ref 0–0.7)
EOSINOPHIL NFR BLD AUTO: 0.8 %
ERYTHROCYTE [DISTWIDTH] IN BLOOD BY AUTOMATED COUNT: 13 % (ref 11.5–14.5)
GLUCOSE SERPL-MCNC: 96 MG/DL (ref 74–99)
HCT VFR BLD AUTO: 36.6 % (ref 41–52)
HDLC SERPL-MCNC: 34.4 MG/DL
HGB BLD-MCNC: 12.9 G/DL (ref 13.5–17.5)
IMM GRANULOCYTES # BLD AUTO: 0.03 X10*3/UL (ref 0–0.7)
IMM GRANULOCYTES NFR BLD AUTO: 0.6 % (ref 0–0.9)
LDLC SERPL CALC-MCNC: 96 MG/DL
LYMPHOCYTES # BLD AUTO: 1.28 X10*3/UL (ref 1.2–4.8)
LYMPHOCYTES NFR BLD AUTO: 24.9 %
MCH RBC QN AUTO: 32.5 PG (ref 26–34)
MCHC RBC AUTO-ENTMCNC: 35.2 G/DL (ref 32–36)
MCV RBC AUTO: 92 FL (ref 80–100)
MONOCYTES # BLD AUTO: 0.42 X10*3/UL (ref 0.1–1)
MONOCYTES NFR BLD AUTO: 8.2 %
NEUTROPHILS # BLD AUTO: 3.35 X10*3/UL (ref 1.2–7.7)
NEUTROPHILS NFR BLD AUTO: 64.9 %
NON HDL CHOLESTEROL: 117 MG/DL (ref 0–149)
NRBC BLD-RTO: 0 /100 WBCS (ref 0–0)
PLATELET # BLD AUTO: 153 X10*3/UL (ref 150–450)
POTASSIUM SERPL-SCNC: 4 MMOL/L (ref 3.5–5.3)
PROT SERPL-MCNC: 6.1 G/DL (ref 6.4–8.2)
PSA SERPL-MCNC: <0.1 NG/ML
RBC # BLD AUTO: 3.97 X10*6/UL (ref 4.5–5.9)
SODIUM SERPL-SCNC: 139 MMOL/L (ref 136–145)
TRIGL SERPL-MCNC: 103 MG/DL (ref 0–149)
VLDL: 21 MG/DL (ref 0–40)
WBC # BLD AUTO: 5.2 X10*3/UL (ref 4.4–11.3)

## 2024-06-13 PROCEDURE — 84153 ASSAY OF PSA TOTAL: CPT

## 2024-06-13 PROCEDURE — 82728 ASSAY OF FERRITIN: CPT

## 2024-06-13 PROCEDURE — 82607 VITAMIN B-12: CPT

## 2024-06-13 PROCEDURE — 73200 CT UPPER EXTREMITY W/O DYE: CPT | Mod: LT

## 2024-06-13 PROCEDURE — 80061 LIPID PANEL: CPT

## 2024-06-13 PROCEDURE — 80053 COMPREHEN METABOLIC PANEL: CPT

## 2024-06-13 PROCEDURE — 83540 ASSAY OF IRON: CPT

## 2024-06-13 PROCEDURE — 36415 COLL VENOUS BLD VENIPUNCTURE: CPT

## 2024-06-13 PROCEDURE — 83550 IRON BINDING TEST: CPT

## 2024-06-13 PROCEDURE — 85025 COMPLETE CBC W/AUTO DIFF WBC: CPT

## 2024-06-15 DIAGNOSIS — D64.9 ANEMIA, UNSPECIFIED TYPE: Primary | ICD-10-CM

## 2024-06-15 LAB
FERRITIN SERPL-MCNC: 551 NG/ML (ref 20–300)
IRON SATN MFR SERPL: 36 % (ref 25–45)
IRON SERPL-MCNC: 110 UG/DL (ref 35–150)
TIBC SERPL-MCNC: 305 UG/DL (ref 240–445)
UIBC SERPL-MCNC: 195 UG/DL (ref 110–370)
VIT B12 SERPL-MCNC: 315 PG/ML (ref 211–911)

## 2024-06-17 ENCOUNTER — TELEPHONE (OUTPATIENT)
Dept: ORTHOPEDIC SURGERY | Facility: CLINIC | Age: 69
End: 2024-06-17
Payer: MEDICARE

## 2024-07-02 ENCOUNTER — PRE-ADMISSION TESTING (OUTPATIENT)
Dept: PREADMISSION TESTING | Facility: HOSPITAL | Age: 69
End: 2024-07-02
Payer: MEDICARE

## 2024-07-02 ENCOUNTER — APPOINTMENT (OUTPATIENT)
Dept: ORTHOPEDIC SURGERY | Facility: CLINIC | Age: 69
End: 2024-07-02
Payer: MEDICARE

## 2024-07-02 ENCOUNTER — LAB (OUTPATIENT)
Dept: LAB | Facility: LAB | Age: 69
End: 2024-07-02
Payer: MEDICARE

## 2024-07-02 VITALS
WEIGHT: 204.15 LBS | TEMPERATURE: 98.1 F | SYSTOLIC BLOOD PRESSURE: 139 MMHG | HEIGHT: 68 IN | HEART RATE: 81 BPM | BODY MASS INDEX: 30.94 KG/M2 | DIASTOLIC BLOOD PRESSURE: 75 MMHG | OXYGEN SATURATION: 98 % | RESPIRATION RATE: 14 BRPM

## 2024-07-02 DIAGNOSIS — R73.09 ABNORMAL GLUCOSE: ICD-10-CM

## 2024-07-02 DIAGNOSIS — M19.012 OSTEOARTHRITIS OF LEFT SHOULDER, UNSPECIFIED OSTEOARTHRITIS TYPE: ICD-10-CM

## 2024-07-02 DIAGNOSIS — Z01.818 PREOP TESTING: ICD-10-CM

## 2024-07-02 DIAGNOSIS — Z01.818 PREOP TESTING: Primary | ICD-10-CM

## 2024-07-02 LAB
EST. AVERAGE GLUCOSE BLD GHB EST-MCNC: 94 MG/DL
HBA1C MFR BLD: 4.9 %

## 2024-07-02 PROCEDURE — 93005 ELECTROCARDIOGRAM TRACING: CPT

## 2024-07-02 PROCEDURE — 83036 HEMOGLOBIN GLYCOSYLATED A1C: CPT

## 2024-07-02 PROCEDURE — 36415 COLL VENOUS BLD VENIPUNCTURE: CPT

## 2024-07-02 PROCEDURE — 99203 OFFICE O/P NEW LOW 30 MIN: CPT

## 2024-07-02 PROCEDURE — 87081 CULTURE SCREEN ONLY: CPT | Mod: BEALAB

## 2024-07-02 RX ORDER — CHLORHEXIDINE GLUCONATE ORAL RINSE 1.2 MG/ML
15 SOLUTION DENTAL DAILY
Qty: 30 ML | Refills: 0 | Status: SHIPPED | OUTPATIENT
Start: 2024-07-02 | End: 2024-07-04

## 2024-07-02 ASSESSMENT — DUKE ACTIVITY SCORE INDEX (DASI)
CAN YOU DO MODERATE WORK AROUND THE HOUSE LIKE VACUUMING, SWEEPING FLOORS OR CARRYING GROCERIES: YES
DASI METS SCORE: 9.9
CAN YOU PARTICIPATE IN STRENOUS SPORTS LIKE SWIMMING, SINGLES TENNIS, FOOTBALL, BASKETBALL, OR SKIING: YES
CAN YOU CLIMB A FLIGHT OF STAIRS OR WALK UP A HILL: YES
CAN YOU HAVE SEXUAL RELATIONS: YES
CAN YOU DO HEAVY WORK AROUND THE HOUSE LIKE SCRUBBING FLOORS OR LIFTING AND MOVING HEAVY FURNITURE: YES
CAN YOU WALK A BLOCK OR TWO ON LEVEL GROUND: YES
CAN YOU DO LIGHT WORK AROUND THE HOUSE LIKE DUSTING OR WASHING DISHES: YES
CAN YOU DO YARD WORK LIKE RAKING LEAVES, WEEDING OR PUSHING A MOWER: YES
TOTAL_SCORE: 58.2
CAN YOU WALK INDOORS, SUCH AS AROUND YOUR HOUSE: YES
CAN YOU PARTICIPATE IN MODERATE RECREATIONAL ACTIVITIES LIKE GOLF, BOWLING, DANCING, DOUBLES TENNIS OR THROWING A BASEBALL OR FOOTBALL: YES
CAN YOU RUN A SHORT DISTANCE: YES
CAN YOU TAKE CARE OF YOURSELF (EAT, DRESS, BATHE, OR USE TOILET): YES

## 2024-07-02 ASSESSMENT — PAIN - FUNCTIONAL ASSESSMENT: PAIN_FUNCTIONAL_ASSESSMENT: 0-10

## 2024-07-02 ASSESSMENT — PAIN SCALES - GENERAL: PAINLEVEL_OUTOF10: 4

## 2024-07-02 ASSESSMENT — PAIN DESCRIPTION - DESCRIPTORS: DESCRIPTORS: SORE

## 2024-07-02 NOTE — CPM/PAT H&P
CPM/PAT Evaluation       Name: Chacho Vences (Chacho Vences)  /Age: 1955/69 y.o.     In-Person       Chief Complaint: Osteoarthritis of left shoulder, unspecified osteoarthritis type, Biceps tendinitis, left, Osteoarthritis of glenohumeral joint, left     HPI  Patient is an alert and oriented 69 year old male scheduled for a left anatomic total shoulder arthroplasty, biceps tenodesis on 2024 with Dr. Velez for osteoarthritis of left shoulder, unspecified osteoarthritis type, Biceps tendinitis, left, Osteoarthritis of glenohumeral joint, left. PMHX includes HTN. Presents to [facility] [place] [place] PAT today for perioperative risk stratification and optimization.     Past Medical History:   Diagnosis Date    Abnormal finding of blood chemistry, unspecified 10/08/2015    Abnormal blood chemistry    Arthritis 2023    Cancer (Multi) 2023    HL (hearing loss) 2022    Hypertension      Past Surgical History:   Procedure Laterality Date    CIRCUMCISION, PRIMARY      HERNIA REPAIR  ,     OTHER SURGICAL HISTORY  2022    Inguinal hernia repair    OTHER SURGICAL HISTORY  2022    Nasal septoplasty    OTHER SURGICAL HISTORY  2022    Tonsillectomy    VASECTOMY       Patient  reports being sexually active and has had partner(s) who are female. He reports using the following method of birth control/protection: Male Sterilization.    Family History   Problem Relation Name Age of Onset    Hearing loss Mother Anjelica Vences     Hypertension Mother Anjelica Vences     Vision loss Mother Anjelica Vences     Miscarriages / Stillbirths Other Tati Vences      Allergies   Allergen Reactions    Lisinopril Cough     Prior to Admission medications    Medication Sig Start Date End Date Taking? Authorizing Provider   amLODIPine (Norvasc) 10 mg tablet Take 1 tablet (10 mg) by mouth once daily. 24  Farhat Jones MD   losartan-hydrochlorothiazide (Hyzaar) 100-25 mg  tablet Take 1 tablet by mouth once daily. 5/6/24 1/31/25  Farhat Jones MD   saccharomyces boulardii (Florastor) 250 mg capsule Take 1 capsule (250 mg) by mouth 2 times a day.    Historical Provider, MD   tadalafil (Cialis) 20 mg tablet Take 1 tablet (20 mg) by mouth once daily as needed for erectile dysfunction. 5/6/24   Farhat Jones MD   tamsulosin (Flomax) 0.4 mg 24 hr capsule Take 1 capsule (0.4 mg) by mouth 2 times a day. 5/6/24 1/31/25  Farhat Jones MD       Review of Systems   Constitutional: Negative for chills, decreased appetite, diaphoresis, fever and malaise/fatigue.   Eyes:  Negative for blurred vision and double vision.   Cardiovascular:  Negative for chest pain, claudication, cyanosis, dyspnea on exertion, irregular heartbeat, leg swelling, near-syncope and palpitations.   Respiratory:  Negative for cough, hemoptysis, shortness of breath and wheezing.    Endocrine: Negative for cold intolerance, heat intolerance, polydipsia, polyphagia and polyuria.   Gastrointestinal:  Negative for abdominal pain, constipation, diarrhea, dysphagia, nausea and vomiting.   Genitourinary:  Negative for bladder incontinence, dysuria, hematuria, incomplete emptying, nocturia, frequency, pelvic pain and urgency.   Neurological:  Negative for headaches, light-headedness, paresthesias, sensory change and weakness.   Psychiatric/Behavioral:  Negative for altered mental status.    Musculoskeletal: Negative for myalgias. Positive for arthralgias. Limited ROM left shoulder     Vitals and nursing note reviewed.     Physical exam  Constitutional:       Appearance: Normal appearance. He is Obese.   HENT:      Head: Normocephalic and atraumatic.      Mouth/Throat:      Mouth: Mucous membranes are moist.      Pharynx: Oropharynx is clear.   Eyes:      Extraocular Movements: Extraocular movements intact.      Conjunctiva/sclera: Conjunctivae normal.      Pupils: Pupils are equal, round, and reactive to light.   Cardiovascular:  "     PMI at left midclavicular line. Normal rate. Regular rhythm. Normal S1. Normal S2.       Murmurs: There is no murmur.      No gallop.  No click. No rub.       No audible carotid bruit     No lower extremity edema on exam  Pulmonary:      Effort: Pulmonary effort is normal.      Breath sounds: Normal breath sounds.   Abdominal:      General: Abdomen is flat. Bowel sounds are normal.      Palpations: Abdomen is soft and non-tender  Musculoskeletal:      Cervical back: Normal range of motion and neck supple.   Skin:     General: Skin is warm and dry.      Capillary Refill: Capillary refill takes less than 2 seconds.   Neurological:      General: No focal deficit present.      Mental Status: He is alert and oriented to person, place, and time. Mental status is at baseline.   Psychiatric:         Mood and Affect: Mood normal.         Behavior: Behavior normal.         Thought Content: Thought content normal.         Judgment: Judgment normal.     Vitals and nursing note reviewed. Physical exam within normal limits.     Visit Vitals  /75   Pulse 81   Temp 36.7 °C (98.1 °F) (Temporal)   Resp 14   Ht 1.725 m (5' 7.91\")   Wt 92.6 kg (204 lb 2.3 oz)   SpO2 98%   BMI 31.12 kg/m²   Smoking Status Never   BSA 2.11 m²      DASI Risk Score      Flowsheet Row Most Recent Value   DASI SCORE 58.2   METS Score (Will be calculated only when all the questions are answered) 9.9          Caprini DVT Assessment      Flowsheet Row Most Recent Value   DVT Score 10   Current Status Elective major lower extremity arthroplasty   Age 60-75 years   BMI 31-40 (Obesity)          Modified Frailty Index      Flowsheet Row Most Recent Value   Modified Frailty Index Calculator .0909          CHADS2 Stroke Risk  Current as of a minute ago        N/A 3 to 100%: High Risk   2 to < 3%: Medium Risk   0 to < 2%: Low Risk     Last Change: N/A          This score determines the patient's risk of having a stroke if the patient has atrial " fibrillation.        This score is not applicable to this patient. Components are not calculated.          Revised Cardiac Risk Index      Flowsheet Row Most Recent Value   Revised Cardiac Risk Calculator 0          Apfel Simplified Score    No data to display       Risk Analysis Index Results This Encounter    No data found in the last 1 encounters.       Stop Bang Score      Flowsheet Row Most Recent Value   Do you snore loudly? 0   Do you often feel tired or fatigued after your sleep? 1   Has anyone ever observed you stop breathing in your sleep? 0   Do you have or are you being treated for high blood pressure? 1   Recent BMI (Calculated) 31.1   Is BMI greater than 35 kg/m2? 0=No   Age older than 50 years old? 1=Yes   Is your neck circumference greater than 17 inches (Male) or 16 inches (Female)? 0   Gender - Male 1=Yes   STOP-BANG Total Score 4          Assessment & Plan:    Neuro:  No diagnosis or significant findings on chart review or clinical presentation and evaluation.     HEENT/Airway:  No diagnosis or significant findings on chart review or clinical presentation and evaluation.   STOP-BANG Score-4 points moderate risk for PHILIPPE    Mallampati::  II    TM distance::  >3 FB    Neck ROM::  Full  Dentures-denies  Crowns-denies  Implants-denies  Reports lower bridge    Cardiovascular:  No significant findings on chart review or clinical presentation and evaluation.   History of Hypertension-Managed with Losartan/hydrochlorothiazide and Amlodipine. BP in /75  METS: 9.9  RCRI: 0 points, 3.9%  risk for postoperative MACE   CORINE: 0.1% risk for postoperative MACE  EKG -NSR Rate- 68 No acute changes    Pulmonary:  No diagnosis or significant findings on chart review or clinical presentation and evaluation.   ARISCAT: <26 points, 1.6% risk of in-hospital postoperative pulmonary complication  PRODIGY: High risk for opioid induced respiratory depression  Smoking History-denies    Renal:  No diagnosis or  significant findings on chart review or clinical presentation and evaluation, however, the patient is at increased risk of perioperative renal complications secondary to age>/= 56, male sex, and HTN. Preventative measures include BP monitoring, medication compliance, and hydration management.   CMP-Completed 6/13/2024. Reviewed. stable    Endocrine:  No diagnosis or significant findings on chart review or clinical presentation and evaluation.   PUC0X-9.9%    Hematologic/Immunology:  No diagnosis or significant findings on chart review or clinical presentation and evaluation.  CBC-Completed 6/13/2024. Reviewed. stable  HGB-12.9  Caprini Score 10, patient at High for postoperative DVT. Pt supplied education/VTE handout    Gastrointestinal:   No diagnosis or significant findings on chart review or clinical presentation and evaluation.   Alcohol use-reports Occasional use  Drug use-denies    Infectious disease:   No diagnosis or significant findings on chart review or clinical presentation and evaluation.   Prescription provided for CHG body wash and dental rinse. CHG use instructions reviewed and provided to patient.  Staph screen collected-Negative    Musculoskeletal:   No significant findings on chart review or clinical presentation and evaluation.   History of Obesity-BMI 31.12  JHFRAT score-6 points. moderate risk for falls    Anesthesia:  No anesthesia complications  No family history of anesthesia complications    Labs & Imaging ordered:  CBC, CMP, HBA1C, MRA, EKG  Nickel/metal allergy-negative  Shellfish allergy-negative    Overall, patient Low Risk for the scheduled Moderate Risk surgery. Discussed with patient medication instructions, NPO guidelines, and any questions or concerns.     Face to face time-25 minutes

## 2024-07-02 NOTE — H&P (VIEW-ONLY)
CPM/PAT Evaluation       Name: Chacho Vences (Chacho Vences)  /Age: 1955/69 y.o.     In-Person       Chief Complaint: Osteoarthritis of left shoulder, unspecified osteoarthritis type, Biceps tendinitis, left, Osteoarthritis of glenohumeral joint, left     HPI  Patient is an alert and oriented 69 year old male scheduled for a left anatomic total shoulder arthroplasty, biceps tenodesis on 2024 with Dr. Velez for osteoarthritis of left shoulder, unspecified osteoarthritis type, Biceps tendinitis, left, Osteoarthritis of glenohumeral joint, left. PMHX includes HTN. Presents to [facility] [place] PAT today for perioperative risk stratification and optimization.     Past Medical History:   Diagnosis Date    Abnormal finding of blood chemistry, unspecified 10/08/2015    Abnormal blood chemistry    Arthritis 2023    Cancer (Multi) 2023    HL (hearing loss) 2022    Hypertension      Past Surgical History:   Procedure Laterality Date    CIRCUMCISION, PRIMARY      HERNIA REPAIR  ,     OTHER SURGICAL HISTORY  2022    Inguinal hernia repair    OTHER SURGICAL HISTORY  2022    Nasal septoplasty    OTHER SURGICAL HISTORY  2022    Tonsillectomy    VASECTOMY       Patient  reports being sexually active and has had partner(s) who are female. He reports using the following method of birth control/protection: Male Sterilization.    Family History   Problem Relation Name Age of Onset    Hearing loss Mother Anjelica Vences     Hypertension Mother Anjelica Vences     Vision loss Mother Anjelica Vences     Miscarriages / Stillbirths Other Tati Vences      Allergies   Allergen Reactions    Lisinopril Cough     Prior to Admission medications    Medication Sig Start Date End Date Taking? Authorizing Provider   amLODIPine (Norvasc) 10 mg tablet Take 1 tablet (10 mg) by mouth once daily. 24  Farhat Jones MD   losartan-hydrochlorothiazide (Hyzaar) 100-25 mg  tablet Take 1 tablet by mouth once daily. 5/6/24 1/31/25  Farhat Jones MD   saccharomyces boulardii (Florastor) 250 mg capsule Take 1 capsule (250 mg) by mouth 2 times a day.    Historical Provider, MD   tadalafil (Cialis) 20 mg tablet Take 1 tablet (20 mg) by mouth once daily as needed for erectile dysfunction. 5/6/24   Farhat Jones MD   tamsulosin (Flomax) 0.4 mg 24 hr capsule Take 1 capsule (0.4 mg) by mouth 2 times a day. 5/6/24 1/31/25  Farhat Jones MD       Review of Systems   Constitutional: Negative for chills, decreased appetite, diaphoresis, fever and malaise/fatigue.   Eyes:  Negative for blurred vision and double vision.   Cardiovascular:  Negative for chest pain, claudication, cyanosis, dyspnea on exertion, irregular heartbeat, leg swelling, near-syncope and palpitations.   Respiratory:  Negative for cough, hemoptysis, shortness of breath and wheezing.    Endocrine: Negative for cold intolerance, heat intolerance, polydipsia, polyphagia and polyuria.   Gastrointestinal:  Negative for abdominal pain, constipation, diarrhea, dysphagia, nausea and vomiting.   Genitourinary:  Negative for bladder incontinence, dysuria, hematuria, incomplete emptying, nocturia, frequency, pelvic pain and urgency.   Neurological:  Negative for headaches, light-headedness, paresthesias, sensory change and weakness.   Psychiatric/Behavioral:  Negative for altered mental status.    Musculoskeletal: Negative for myalgias. Positive for arthralgias. Limited ROM left shoulder     Vitals and nursing note reviewed.     Physical exam  Constitutional:       Appearance: Normal appearance. He is Obese.   HENT:      Head: Normocephalic and atraumatic.      Mouth/Throat:      Mouth: Mucous membranes are moist.      Pharynx: Oropharynx is clear.   Eyes:      Extraocular Movements: Extraocular movements intact.      Conjunctiva/sclera: Conjunctivae normal.      Pupils: Pupils are equal, round, and reactive to light.   Cardiovascular:  "     PMI at left midclavicular line. Normal rate. Regular rhythm. Normal S1. Normal S2.       Murmurs: There is no murmur.      No gallop.  No click. No rub.       No audible carotid bruit     No lower extremity edema on exam  Pulmonary:      Effort: Pulmonary effort is normal.      Breath sounds: Normal breath sounds.   Abdominal:      General: Abdomen is flat. Bowel sounds are normal.      Palpations: Abdomen is soft and non-tender  Musculoskeletal:      Cervical back: Normal range of motion and neck supple.   Skin:     General: Skin is warm and dry.      Capillary Refill: Capillary refill takes less than 2 seconds.   Neurological:      General: No focal deficit present.      Mental Status: He is alert and oriented to person, place, and time. Mental status is at baseline.   Psychiatric:         Mood and Affect: Mood normal.         Behavior: Behavior normal.         Thought Content: Thought content normal.         Judgment: Judgment normal.     Vitals and nursing note reviewed. Physical exam within normal limits.     Visit Vitals  /75   Pulse 81   Temp 36.7 °C (98.1 °F) (Temporal)   Resp 14   Ht 1.725 m (5' 7.91\")   Wt 92.6 kg (204 lb 2.3 oz)   SpO2 98%   BMI 31.12 kg/m²   Smoking Status Never   BSA 2.11 m²      DASI Risk Score      Flowsheet Row Most Recent Value   DASI SCORE 58.2   METS Score (Will be calculated only when all the questions are answered) 9.9          Caprini DVT Assessment      Flowsheet Row Most Recent Value   DVT Score 10   Current Status Elective major lower extremity arthroplasty   Age 60-75 years   BMI 31-40 (Obesity)          Modified Frailty Index      Flowsheet Row Most Recent Value   Modified Frailty Index Calculator .0909          CHADS2 Stroke Risk  Current as of a minute ago        N/A 3 to 100%: High Risk   2 to < 3%: Medium Risk   0 to < 2%: Low Risk     Last Change: N/A          This score determines the patient's risk of having a stroke if the patient has atrial " fibrillation.        This score is not applicable to this patient. Components are not calculated.          Revised Cardiac Risk Index      Flowsheet Row Most Recent Value   Revised Cardiac Risk Calculator 0          Apfel Simplified Score    No data to display       Risk Analysis Index Results This Encounter    No data found in the last 1 encounters.       Stop Bang Score      Flowsheet Row Most Recent Value   Do you snore loudly? 0   Do you often feel tired or fatigued after your sleep? 1   Has anyone ever observed you stop breathing in your sleep? 0   Do you have or are you being treated for high blood pressure? 1   Recent BMI (Calculated) 31.1   Is BMI greater than 35 kg/m2? 0=No   Age older than 50 years old? 1=Yes   Is your neck circumference greater than 17 inches (Male) or 16 inches (Female)? 0   Gender - Male 1=Yes   STOP-BANG Total Score 4          Assessment & Plan:    Neuro:  No diagnosis or significant findings on chart review or clinical presentation and evaluation.     HEENT/Airway:  No diagnosis or significant findings on chart review or clinical presentation and evaluation.   STOP-BANG Score-4 points moderate risk for PHILIPPE    Mallampati::  II    TM distance::  >3 FB    Neck ROM::  Full  Dentures-denies  Crowns-denies  Implants-denies  Reports lower bridge    Cardiovascular:  No significant findings on chart review or clinical presentation and evaluation.   History of Hypertension-Managed with Losartan/hydrochlorothiazide and Amlodipine. BP in /75  METS: 9.9  RCRI: 0 points, 3.9%  risk for postoperative MACE   CORINE: 0.1% risk for postoperative MACE  EKG -NSR Rate- 68 No acute changes    Pulmonary:  No diagnosis or significant findings on chart review or clinical presentation and evaluation.   ARISCAT: <26 points, 1.6% risk of in-hospital postoperative pulmonary complication  PRODIGY: High risk for opioid induced respiratory depression  Smoking History-denies    Renal:  No diagnosis or  significant findings on chart review or clinical presentation and evaluation, however, the patient is at increased risk of perioperative renal complications secondary to age>/= 56, male sex, and HTN. Preventative measures include BP monitoring, medication compliance, and hydration management.   CMP-Completed 6/13/2024. Reviewed. stable    Endocrine:  No diagnosis or significant findings on chart review or clinical presentation and evaluation.   SAO5E-5.9%    Hematologic/Immunology:  No diagnosis or significant findings on chart review or clinical presentation and evaluation.  CBC-Completed 6/13/2024. Reviewed. stable  HGB-12.9  Caprini Score 10, patient at High for postoperative DVT. Pt supplied education/VTE handout    Gastrointestinal:   No diagnosis or significant findings on chart review or clinical presentation and evaluation.   Alcohol use-reports Occasional use  Drug use-denies    Infectious disease:   No diagnosis or significant findings on chart review or clinical presentation and evaluation.   Prescription provided for CHG body wash and dental rinse. CHG use instructions reviewed and provided to patient.  Staph screen collected-Negative    Musculoskeletal:   No significant findings on chart review or clinical presentation and evaluation.   History of Obesity-BMI 31.12  JHFRAT score-6 points. moderate risk for falls    Anesthesia:  No anesthesia complications  No family history of anesthesia complications    Labs & Imaging ordered:  CBC, CMP, HBA1C, MRA, EKG  Nickel/metal allergy-negative  Shellfish allergy-negative    Overall, patient Low Risk for the scheduled Moderate Risk surgery. Discussed with patient medication instructions, NPO guidelines, and any questions or concerns.     Face to face time-25 minutes

## 2024-07-02 NOTE — PREPROCEDURE INSTRUCTIONS
Medication List            Accurate as of July 2, 2024  9:42 AM. Always use your most recent med list.                amLODIPine 10 mg tablet  Commonly known as: Norvasc  Take 1 tablet (10 mg) by mouth once daily.  Medication Adjustments for Surgery: Take morning of surgery with sip of water, no other fluids     cyanocobalamin 50 mcg tablet  Commonly known as: Vitamin B-12  Medication Adjustments for Surgery: Stop 7 days before surgery  Notes to patient: Last dose preoperatively 7/8/2024     losartan-hydrochlorothiazide 100-25 mg tablet  Commonly known as: Hyzaar  Take 1 tablet by mouth once daily.  Medication Adjustments for Surgery: Stop 1 day before surgery  Notes to patient: Last dose preoperatively 7/15/2024     saccharomyces boulardii 250 mg capsule  Commonly known as: Florastor  Medication Adjustments for Surgery: Stop 7 days before surgery  Notes to patient: Last dose preoperatively 7/8/2024     tadalafil 20 mg tablet  Commonly known as: Cialis  Take 1 tablet (20 mg) by mouth once daily as needed for erectile dysfunction.  Medication Adjustments for Surgery: Stop 3 days before surgery  Notes to patient: Last dose preoperatively 7/12/2024     tamsulosin 0.4 mg 24 hr capsule  Commonly known as: Flomax  Take 1 capsule (0.4 mg) by mouth 2 times a day.  Medication Adjustments for Surgery: Take morning of surgery with sip of water, no other fluids            NPO Instructions:     Do not eat any food after midnight the night before your surgery/procedure.  You may have clear liquids until TWO hours before surgery/procedure. This includes water, black tea/coffee, (no milk or cream) apple juice and electrolyte drinks (Gatorade).  You may chew gum up to TWO hours before your surgery/procedure.     Additional Instructions:      Seven/Six Days before Surgery:  We have sent a prescription for CHG 0.12% mouthwash to your preferred pharmacy.  If you have not already, Please  your prescription and start using the  day before before surgery.  Follow the instruction sheet provided to you at your CPM/PAT appointment. Please [place] [facility] if you do not receive your CHG mouthwash prescription.   Review your medication instructions, stop indicated medications  Five Days before Surgery:  Review your medication instructions, stop indicated medications  Begin using your Hibiclens  Three Days before Surgery:  Review your medication instructions, stop indicated medications  The Day before Surgery:  Review your medication instructions, stop indicated medications  You will be contacted regarding the time of your arrival to facility and surgery time  Do not eat any food after Midnight  Day of Surgery:  Review your medication instructions, take indicated medications  If you have diabetes, please check your fasting blood sugar upon awakening.  If fasting blood sugar is <80 mg/dl, drink 100 ml of apple juice, time limit of 2 hours before  You may have clear liquids until TWO hours before surgery/procedure.  This includes water, black tea/coffee, (no milk or cream) apple juice and electrolyte drinks (Gatorade)  You may chew gum up to TWO hours before your surgery/procedure  Wear  comfortable loose fitting clothing  Do not use moisturizers, creams, lotions or perfume  All jewelry and valuables should be left at home     CONTACT SURGEON'S OFFICE IF YOU DEVELOP:  * Fever = 100.4 F   * New respiratory symptoms (e.g. cough, shortness of breath, respiratory distress, sore throat)  * Recent loss of taste or smell  *Flu like symptoms such as headache, fatigue or gastrointestinal symptoms  * You develop any open sores, shingles, burning or painful urination   AND/OR:  * You no longer wish to have the surgery.  * Any other personal circumstances change that may lead to the need to cancel or defer this surgery.  *You were admitted to any hospital within one week of your planned procedure.     SMOKING:  *Quitting smoking can make a huge difference to  your health and recovery from surgery.    *If you need help with quitting, call 9-995-QUIT-NOW.     THE DAY BEFORE SURGERY:  *Do not eat any food after midnight the night before your surgery.   *You may have up to TEN OUNCES of clear liquids until TWO hours before your instructed ARRIVAL TIME to hospital. This includes water, black tea/coffee, (no milk or cream) apple juice, clear broth and electrolyte drinks (Gatorade). Please avoid clear liquids that are red in color.   *You may chew gum/mints up to TWO hours before your surgery/procedure.     SURGICAL TIME:  *You will be contacted between 2 p.m. and 3 p.m. the business day before your surgery with your arrival time.  *If you haven't received a call by 3pm, call [phone]  *Scheduled surgery times may change and you will be notified if this occurs-check your personal voicemail for any updates.     ON THE MORNING OF SURGERY:  *Wear comfortable, loose fitting clothing.   *Do not use moisturizers, creams, lotions or perfume.  *All jewelry and valuables should be left at home.  *Prosthetic devices such as contact lenses, hearing aids, dentures, eyelash extensions, hairpins and body piercing must be removed before surgery.     BRING WITH YOU:  *Photo ID and insurance card  *Current list of medications and allergies  *Pacemaker/Defibrillator/Heart stent cards  *CPAP machine and mask  *Slings/splints/crutches  *Copy of your complete Advanced Directive/DHPOA-if applicable  *Neurostimulator implant remote     PARKING AND ARRIVAL:  *Check in at the Main Entrance desk and let them know you are here for surgery.     IF YOU ARE HAVING OUTPATIENT/SAME DAY SURGERY:  *A responsible adult MUST accompany you at the time of discharge and stay with you for 24 hours after your surgery.  *You may NOT drive yourself home after surgery.  *You may use a taxi or ride sharing service ([employer], [employer]) to return home ONLY if you are accompanied by a friend or family member.  *Instructions  for resuming your medications will be provided by your surgeon.     Thank you for coming to Pre Admission testing.      If I have prescribe medication please don't forget to  at your pharmacy.      Any questions about today's visit call [phone] and leave a message in the general mailbox.     Patient instructed to ambulate as soon as possible postoperatively to decrease thromboembolic risk.     Derrell Vernon, APRN-CNP     Thank you for visiting [facility].  If you have any changes to your health condition, please call the surgeons office to alert them and give them details of your symptoms.        Preoperative Fasting Guidelines     Why must I stop eating and drinking near surgery time?  With sedation, food or liquid in your stomach can enter your lungs causing serious complications  Increases nausea and vomiting     When do I need to stop eating and drinking before my surgery?  Do not eat any food after midnight the night before your surgery/procedure.  You may have up to TEN ounces of clear liquid until TWO hours before your instructed arrival time to the hospital.  This includes water, black tea/coffee, (no milk or cream) apple juice, and electrolyte drinks (Gatorade)  You may chew gum until TWO hours before your surgery/procedure        Additional Instructions:      The Day before Surgery:  -Review your medication instructions, stop indicated medications  -You will be contacted in the evening regarding the time of your arrival to facility and surgery time     Day of Surgery:  -Review your medication instructions, take indicated medications  -Wear comfortable loose fitting clothing  -Do not use moisturizers, creams, lotions or perfume  -All jewelry and valuables should be left at home                   Preoperative Brain Exercises     What are brain exercises?  A brain exercise is any activity that engages your thinking (cognitive) skills.     What types of activities are  considered brain exercises?  Jigsaw puzzles, crossword puzzles, word jumble, memory games, word search, and many more.  Many can be found free online or on your phone via a mobile sushant.     Why should I do brain exercises before my surgery?  More recent research has shown brain exercise before surgery can lower the risk of postoperative delirium (confusion) which can be especially important for older adults.  Patients who did brain exercises for 5 to 10 hours the days before surgery, cut their risk of postoperative delirium in half up to 1 week after surgery.                         [facility]     Preoperative Deep Breathing Exercises     Why it is important to do deep breathing exercises before my surgery?  Deep breathing exercises strengthen your breathing muscles.  This helps you to recover after your surgery and decreases the chance of breathing complications.        How are the deep breathing exercises done?  Sit straight with your back supported.  Breathe in deeply and slowly through your nose. Your lower rib cage should expand and your abdomen may move forward.  Hold that breath for 3 to 5 seconds.  Breathe out through pursed lips, slowly and completely.  Rest and repeat 10 times every hour while awake.  Rest longer if you become dizzy or lightheaded.                      [facility]     Preoperative Deep Breathing Exercises     Why it is important to do deep breathing exercises before my surgery?  Deep breathing exercises strengthen your breathing muscles.  This helps you to recover after your surgery and decreases the chance of breathing complications.        How are the deep breathing exercises done?  Sit straight with your back supported.  Breathe in deeply and slowly through your nose. Your lower rib cage should expand and your abdomen may move forward.  Hold that breath for 3 to 5 seconds.  Breathe out through pursed lips, slowly and completely.  Rest  and repeat 10 times every hour while awake.  Rest longer if you become dizzy or lightheaded.        Patient Information: Incentive Spirometer  What is an incentive spirometer?  An incentive spirometer is a device used before and after surgery to “exercise” your lungs.  It helps you to take deeper breaths to expand your lungs.  Below is an example of a basic incentive spirometer.  The device you receive may differ slightly but they all function the same.    Why do I need to use an incentive spirometer?  Using your incentive spirometer prepares your lungs for surgery and helps prevent lung problems after surgery.  How do I use my incentive spirometer?  When you're using your incentive spirometer, make sure to breathe through your mouth. If you breathe through your nose, the incentive spirometer won't work properly. You can hold your nose if you have trouble.  If you feel dizzy at any time, stop and rest. Try again at a later time.  Follow the steps below:  Set up your incentive spirometer, expand the flexible tubing and connect to the outlet.  Sit upright in a chair or bed. Hold the incentive spirometer at eye level.   Put the mouthpiece in your mouth and close your lips tightly around it. Slowly breathe out (exhale) completely.  Breathe in (inhale) slowly through your mouth as deeply as you can. As you take a breath, you will see the piston rise inside the large column. While the piston rises, the indicator should move upwards. It should stay in between the 2 arrows (see Figure).  Try to get the piston as high as you can, while keeping the indicator between the arrows.   If the indicator doesn't stay between the arrows, you're breathing either too fast or too slow.  When you get it as high as you can, hold your breath for 10 seconds, or as long as possible. While you're holding your breath, the piston will slowly fall to the base of the spirometer.  Once the piston reaches the bottom of the spirometer, breathe out  slowly through your mouth. Rest for a few seconds.  Repeat 10 times. Try to get the piston to the same level with each breath.  Repeat every hour while awake  You can carefully clean the outside of the mouthpiece with an alcohol wipe or soap and water.       Patient and Family Education             Ways You Can Help Prevent Blood Clots                    This handout explains some simple things you can do to help prevent blood clots.      Blood clots are blockages that can form in the body's veins. When a blood clot forms in your deep veins, it may be called a deep vein thrombosis, or DVT for short. Blood clots can happen in any part of the body where blood flows, but they are most common in the arms and legs. If a piece of a blood clot breaks free and travels to the lungs, it is called a pulmonary embolus (PE). A PE can be a very serious problem.         Being in the hospital or having surgery can raise your chances of getting a blood clot because you may not be well enough to move around as much as you normally do.         Ways you can help prevent blood clots in the hospital           Wearing SCDs. SCDs stands for Sequential Compression Devices.   SCDs are special sleeves that wrap around your legs  They attach to a pump that fills them with air to gently squeeze your legs every few minutes.   This helps return the blood in your legs to your heart.   SCDs should only be taken off when walking or bathing.   SCDs may not be comfortable, but they can help save your life.                                            Wearing compression stockings - if your doctor orders them. These special snug fitting stockings gently squeeze your legs to help blood flow.       Walking. Walking helps move the blood in your legs.   If your doctor says it is ok, try walking the halls at least   5 times a day. Ask us to help you get up, so you don't fall.      Taking any blood thinning medicines your doctor orders.        Page 1 of 2             ©[facility]; 3/23   Ways you can help prevent blood clots at home         Wearing compression stockings - if your doctor orders them. ? Walking - to help move the blood in your legs.       Taking any blood thinning medicines your doctor orders.      Signs of a blood clot or PE        Tell your doctor or nurse know right away if you have of the problems listed below.    If you are at home, seek medical care right away. Call 911 for chest pain or problems breathing.                Signs of a blood clot (DVT) - such as pain,  swelling, redness or warmth in your arm or leg      Signs of a pulmonary embolism (PE) - such as chest     pain or feeling short of breath

## 2024-07-03 LAB
ATRIAL RATE: 68 BPM
P AXIS: 40 DEGREES
P OFFSET: 195 MS
P ONSET: 141 MS
PR INTERVAL: 160 MS
Q ONSET: 221 MS
QRS COUNT: 11 BEATS
QRS DURATION: 84 MS
QT INTERVAL: 386 MS
QTC CALCULATION(BAZETT): 410 MS
QTC FREDERICIA: 402 MS
R AXIS: 13 DEGREES
T AXIS: 33 DEGREES
T OFFSET: 414 MS
VENTRICULAR RATE: 68 BPM

## 2024-07-04 LAB — STAPHYLOCOCCUS SPEC CULT: NORMAL

## 2024-07-12 ENCOUNTER — HOSPITAL ENCOUNTER (OUTPATIENT)
Dept: RADIATION ONCOLOGY | Facility: CLINIC | Age: 69
Setting detail: RADIATION/ONCOLOGY SERIES
Discharge: HOME | End: 2024-07-12
Payer: MEDICARE

## 2024-07-12 VITALS
BODY MASS INDEX: 31.57 KG/M2 | WEIGHT: 207.1 LBS | DIASTOLIC BLOOD PRESSURE: 77 MMHG | RESPIRATION RATE: 18 BRPM | OXYGEN SATURATION: 98 % | HEART RATE: 78 BPM | SYSTOLIC BLOOD PRESSURE: 138 MMHG | TEMPERATURE: 97.5 F

## 2024-07-12 DIAGNOSIS — C61 PROSTATE CANCER (MULTI): Primary | ICD-10-CM

## 2024-07-12 PROCEDURE — 99214 OFFICE O/P EST MOD 30 MIN: CPT | Performed by: STUDENT IN AN ORGANIZED HEALTH CARE EDUCATION/TRAINING PROGRAM

## 2024-07-12 ASSESSMENT — COLUMBIA-SUICIDE SEVERITY RATING SCALE - C-SSRS
6. HAVE YOU EVER DONE ANYTHING, STARTED TO DO ANYTHING, OR PREPARED TO DO ANYTHING TO END YOUR LIFE?: NO
1. IN THE PAST MONTH, HAVE YOU WISHED YOU WERE DEAD OR WISHED YOU COULD GO TO SLEEP AND NOT WAKE UP?: NO
2. HAVE YOU ACTUALLY HAD ANY THOUGHTS OF KILLING YOURSELF?: NO

## 2024-07-12 ASSESSMENT — ENCOUNTER SYMPTOMS
DEPRESSION: 0
LOSS OF SENSATION IN FEET: 0
OCCASIONAL FEELINGS OF UNSTEADINESS: 0

## 2024-07-12 ASSESSMENT — PAIN SCALES - GENERAL: PAINLEVEL: 2

## 2024-07-12 NOTE — DISCHARGE INSTRUCTIONS
Jose Vleez M.D.   Sports Medicine Orthopaedic Surgery    [facility]  [address] [address]                  [address] [facility]       [address]  [facility], [address]   Phone: [phone]         Phone: [phone]       Phone: [phone]   Fax: [phone]                         Fax: [phone]       Fax: [phone]     After hours phone number: [phone]    AFTER SURGERY     Anesthesia  If you received a nerve block during surgery, you may have numbness or inability to move the limb. Do not be alarmed as this may last 8-36 hours depending upon the amount and type of medication used by the anesthesiologist. Make sure If you are experiencing numbness after 36 hours, please call the office. When the nerve block begins to wear off, you will feel a tingling sensation, like pins and needles. It is important that you start taking the pain medication at that time to ensure that you “stay ahead of the pain.” It is important to take the pain medicine when the pain level is a 4 or 5/10, before it gets too high.    Do not operate heavy machinery, make major decisions, drink alcohol or smoke for 24 hours. Do not drink alcohol while taking pain medications.    Prescribed Medications   Narcotic pain medicine (Oxycodone): The goal of post-operative pain management is pain control, NOT pain elimination. You should expect some pain after surgery - this pain helps you protect itself while it is healing. Constipation, nausea, itching, and drowsiness are side effects of this type of medication. You should take an over-the-counter stool softener (Colace and/or Senna) while taking narcotics to prevent constipation. If you experience itching, over the counter Benadryl may be helpful. Narcotic pain medications often produce drowsiness and it is against the law to operate a vehicle  while taking these medications. If you are taking oxycodone, you should take acetaminophen (Tylenol) around the clock to decrease baseline pain. Do not take Tylenol-containing products while on Percocet or Stanton.   Refill Policy: For concerns over your safety due to the rising opioid addiction epidemic in the [place], refills of your narcotic pain medications will only be provided on a case by case basis. Please use these medications judiciously.    Anti-inflammatory (NSAID) medicine (Naproxen or Mobic): These are both anti-inflammatory and pain relief. Do NOT take this medication if you have had an ulcer in the past unless you have cleared this with your primary care doctor. You should take NSAIDs with food or antacid to reduce the chance of upset stomach. Depending on your surgery, Dr. Velez may instruct you to avoid these medications.    Anti-nausea medicine (ondansetron/Zofran): sometimes patients experience nausea related to either anesthesia or the narcotic pain medication. If this is the case you will find this medication helpful.    DVT prophylaxis (Aspirin or Eliquis): For most patients, activity alone is sufficient to prevent dangerous blood clots, but in some cases your personal risk profile and/or the type of surgery you have undergone makes it necessary that you take medication to help prevent blood clots. Dr. Velez will inform you if you are to start one of these medications postoperatively.    Stool softener (Colace and/or Senna): are available over the counter at your local pharmacy and should be taken while you are taking narcotic pain medication to avoid constipation. You should stop taking these medications if you develop diarrhea. Over the counter laxatives may be used if you develop painful constipation.     Diet   Start with clear liquids (water, juice, Gatorade) and light foods (jello, soup, crackers). Progress to normal diet as tolerated if you are not nauseated. Avoid greasy or spicy  foods for the first 24hrs to avoid GI upset. Increase fluid intake to help prevent constipation.    Dressings / Wound Care  You may shower after 3 days. Your dressing is waterproof and can remain for 7 days. If the dressing appears wet or dirty then you can remove sooner. After the bandage has been removed, you may leave the incisions open to air. Alternatively, if you prefer to keep them covered, you may do so with a light gauze dressing, or a clean ACE wrap. It is OK to allow soap and water to run over the wound - DO NOT soak or scrub the wound. Outside of the shower, keep your incision clean and dry until your first postoperative visit, approximately 10-14 days after surgery. You may remove your sling (total shoulder patients) to shower, unless otherwise instructed. As your balance may be affected by recent surgery, we recommend placing a plastic chair in the shower to help prevent falls. Do NOT take baths, go into a pool, or soak the operative site until approved by Dr. Velez.    Bracing / Physical Therapy   If you were given one, make sure you wear sling at all times until your follow-up with Dr. Velez. Only remove your sling or brace for physical therapy, home exercises, and hygiene. These are typically used for 4-6 weeks after surgery in order to protect the healing of the tissue.     Physical therapy is just as important to your recovery as the actual operation! If you were given a prescription for physical therapy, make sure you go to your appointments and do your exercises daily at home (especially motion exercises).     Ice is a very important part of your recovery. It helps reduce inflammation and improves pain control. You should ice a few times each day for 20-30 minutes at a time. Please make sure there is something under the ice (clean towel, cloth, T-shirt) so that the ice doesn't directly contact your skin. If you ordered a commercially available ice machine (optional) and a compression setting  is available, you should use LOW or no compression during the first 5 days. After that, you may increase compression setting as tolerated. If the compression is bothering you then do not use compression.    Driving / Travel  Ultimately, it is your judgment to decide when you are safe to drive, but if you are at all unsure, do not risk your life or someone else's. As a general guideline, you will not be able to drive for 4-6 weeks after surgery. You should certainly not drive while on narcotics pain medication or while in a brace or sling.     Avoid flights and long distance traveling for 6 weeks after surgery. It is important to discuss your travel plans with Dr. Velez, as additional medications may need to be prescribed to help prevent blood clots if certain travel is unavoidable.     Return to Work or School   Your return to work will depend on what surgery was done and what type of work you do. Please note that these are general guidelines, and there may be modifications based on your unique situation. Typically, you may return to sedentary work or school 3-7 days after surgery if pain is tolerable and you are no longer requiring narcotic pain medication. In conjunction with your input, Dr. Velez will determine when you may return to more physically rigorous demands.     If you had Knee Replacement Surgery   You can be weight bearing as tolerated unless instructed otherwise. You should use a walker, crutches or cane as an assistive device until instructed otherwise. Individual rehabilitation guidelines will vary based upon the unique situation and surgery of every patient, but take these general guidelines into account when planning return to work.     If you had Shoulder Replacement Surgery   You will have a sling on for three weeks after surgery. Please wear the sling at all times except for bathing for the first 2 weeks minimum. As long as you can abide by the restrictions, you can return to work when you  feel like you can do so safely. However, you will need to take into consideration driving and activities related to your job. You may be able to safely loosen it if you are able to keep your arm supported. Please understand that you will NOT be able to work with your arm away from your body, above shoulder level, or use your arm against gravity for approximately 4-6 weeks. For jobs that require physical labor, you may require four months or more to return to work. If your surgery does NOT involve a repair (subacromial decompression, distal clavicle excision, capsular release), then you will be in a sling for only a few days after surgery. When comfortable, you may return to work when ready to conduct normal activities of your job. Remember that you may be on narcotic pain medications and these should be discontinued prior to your return to work. For jobs that require physical labor, you may require 6 weeks or more to return to work.     Normal Sensations and Findings after Surgery:   PAIN: We do everything possible to make your pain/discomfort level tolerable, but some amount of pain is to be expected.   WARMTH: Mild warmth around the operative site is normal for up to 3 weeks.   REDNESS: Small amount of redness where the sutures enter the skin is normal. If redness worsens or spreads it is important that you contact the office.   DRAINAGE: A small amount is normal for the first 48-72 hours. If wounds continue to drain after this time (requiring multiple gauze changes per day), please contact the office.   NUMBNESS: Around the incision is common.   BRUISING: Is common and often tracks down the arm or leg due to gravity and results in an alarming appearance, but is common and will resolve with time.   FEVER: Low-grade fevers (less than 101.5°F) are common during the first week after surgery. You should drink plenty of fluids and breathe deeply.   CONSTIPATION: Post-operative pain medications as well as immobility can  lead to constipation. Please consider taking an over the counter stool softener such as Colace and/or Senna if you experience constipation or if you have a history of constipation.    Follow-Up   A Follow-up appointment should be arranged for 10-14 days after surgery. If one has not been provided, please call the office to schedule.       NOTIFY US IMMEDIATELY FOR ANY OF THE FOLLOWING:   Most orthopedic surgical procedures are uneventful. However, complications can occur. The following are things to be aware of in the immediate postoperative period.   FEVER - Temperature rises above 101.5°F or associated chills/sweats   WOUND - If you notice drainage more than 4 days after surgery, if the drainage turns yellows and foul smelling, if you need to change gauze multiple times per day, or if sutures become loose.   CARDIOVASCULAR - Chest pain, shortness of breath, palpitations, or fainting spells must be taken seriously. Go to the emergency room (or call 911) immediately for evaluation.   BLOOD CLOTS - Orthopaedic surgery patients are at risk for blood clots. While the risk is higher for lower extremity surgery, even those who have undergone upper extremity surgery are at an increased risk. Please notify Dr. Velez if you or someone in your family has had blood clots or any type of known clotting disorder. Signs of blood clots may include calf pain or cramping, diffuse swelling in the leg and foot, or chest pain and shortness of breath. Please call the office or go to the hospital if you recognize any of these symptoms.   NAUSEA - If you have severe vomiting, diarrhea, or constipation, or cannot keep any liquid down   URINARY RETENTION - If you cannot urinate the night after surgery, please go to the Emergency Room.

## 2024-07-12 NOTE — PROGRESS NOTES
Radiation Oncology Follow-Up    Patient Name:  Chacho Vences  MRN:  [id number]  :  1955    Referring Provider: No ref. provider found  Primary Care Provider: Farhat Jones MD  Care Team: Patient Care Team:  Farhat Jones MD as PCP - General (Family Medicine)  Far[facility] Jones [facility]    Date of Service: 2024    SUBJECTIVE  History of Present Illness:  Chacho Vences is a 69 y.o. 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 He received definitive prostate SBRT 37.5 Gy/5 fractions completed 23. He had SpaceOAR hydrogel and fiducials placed prior to treatment. He declined ADT.    Lab Results   Component Value Date    PSA <0.10 2024    PSA 0.38 2023    PSA 0.44 10/05/2023      Today, he complains of ongoing left shoulder pain, for which he will get surgery next week. He is taking Flomax 0.4 mg twice a day now, with nocturia once per night, and some hesitancy during the day. He denies dysuria, hematuria, loose stools, blood in his stool, or unexplained weight loss.     Treatment Rendered:   Prostate SBRT 37.5 Gy/5 fractions completed 23       Review of Systems:   Review of Systems   All other systems reviewed and are negative.      Performance Status:   The Karnofsky performance scale today is 100, Fully active, able to carry on all pre-disease performed without restriction (ECOG equivalent 0).       OBJECTIVE  Vital Signs:  /77 (BP Location: Left arm, Patient Position: Sitting, BP Cuff Size: Large adult long)   Pulse 78   Temp 36.4 °C (97.5 °F) (Temporal)   Resp 18   Wt 93.9 kg (207 lb 1.6 oz)   SpO2 98%   BMI 31.57 kg/m²   Physical Exam  Constitutional:       General: He is not in acute distress.     Appearance: Normal appearance. He is not  toxic-appearing.   HENT:      Head: Normocephalic and atraumatic.      Mouth/Throat:      Mouth: Mucous membranes are moist.      Pharynx: No oropharyngeal exudate or posterior oropharyngeal erythema.   Eyes:      General: No scleral icterus.     Extraocular Movements: Extraocular movements intact.      Conjunctiva/sclera: Conjunctivae normal.      Pupils: Pupils are equal, round, and reactive to light.   Pulmonary:      Effort: Pulmonary effort is normal. No respiratory distress.   Musculoskeletal:         General: Normal range of motion.      Cervical back: Normal range of motion and neck supple.      Right lower leg: No edema.      Left lower leg: No edema.   Skin:     General: Skin is warm and dry.      Findings: No lesion or rash.   Neurological:      General: No focal deficit present.      Mental Status: He is alert and oriented to person, place, and time.      Cranial Nerves: No cranial nerve deficit.      Sensory: No sensory deficit.      Motor: No weakness.      Coordination: Coordination normal.      Gait: Gait normal.   Psychiatric:         Mood and Affect: Mood normal.         Behavior: Behavior normal.            ASSESSMENT:   Chacho Vences is a 69 y.o. male with unfavorable intermediate risk prostate cancer, [id number], [place]  3+4=7 (9/14 cores involved), pretreatment PSA 5.54 ng/mL. He received definitive prostate SBRT 37.5 Gy/5 fractions completed 6/23/23. He had SpaceOAR hydrogel and fiducials placed prior to treatment. He declined ADT.     His last PSA was <0.10 ng/mL on 6/13/24. He is feeling well besides his left shoulder pain for which he will get surgery next week. He is taking Flomax twice a day for mild LUTS. He maintains excellent performance status (ECOG 0).    I will see him in about 6 months with repeat PSA labwork.    NCCN Guidelines were applicable to guide this patients treatment plan.    Geovany Avery MD

## 2024-07-16 ENCOUNTER — APPOINTMENT (OUTPATIENT)
Dept: RADIOLOGY | Facility: HOSPITAL | Age: 69
End: 2024-07-16
Payer: MEDICARE

## 2024-07-16 ENCOUNTER — SURGERY (OUTPATIENT)
Age: 69
End: 2024-07-16
Payer: MEDICARE

## 2024-07-16 ENCOUNTER — ANESTHESIA (OUTPATIENT)
Dept: OPERATING ROOM | Facility: HOSPITAL | Age: 69
End: 2024-07-16
Payer: MEDICARE

## 2024-07-16 ENCOUNTER — ANESTHESIA EVENT (OUTPATIENT)
Dept: OPERATING ROOM | Facility: HOSPITAL | Age: 69
End: 2024-07-16
Payer: MEDICARE

## 2024-07-16 ENCOUNTER — HOSPITAL ENCOUNTER (OUTPATIENT)
Facility: HOSPITAL | Age: 69
Setting detail: OUTPATIENT SURGERY
Discharge: HOME | End: 2024-07-16
Attending: STUDENT IN AN ORGANIZED HEALTH CARE EDUCATION/TRAINING PROGRAM | Admitting: STUDENT IN AN ORGANIZED HEALTH CARE EDUCATION/TRAINING PROGRAM
Payer: MEDICARE

## 2024-07-16 VITALS
BODY MASS INDEX: 31.17 KG/M2 | WEIGHT: 205.69 LBS | TEMPERATURE: 97.9 F | OXYGEN SATURATION: 95 % | HEART RATE: 71 BPM | RESPIRATION RATE: 18 BRPM | SYSTOLIC BLOOD PRESSURE: 124 MMHG | HEIGHT: 68 IN | DIASTOLIC BLOOD PRESSURE: 67 MMHG

## 2024-07-16 DIAGNOSIS — M19.012 OSTEOARTHRITIS OF GLENOHUMERAL JOINT, LEFT: Primary | ICD-10-CM

## 2024-07-16 DIAGNOSIS — M75.22 BICEPS TENDINITIS, LEFT: ICD-10-CM

## 2024-07-16 DIAGNOSIS — M19.012 OSTEOARTHRITIS OF LEFT SHOULDER, UNSPECIFIED OSTEOARTHRITIS TYPE: ICD-10-CM

## 2024-07-16 PROCEDURE — 2500000004 HC RX 250 GENERAL PHARMACY W/ HCPCS (ALT 636 FOR OP/ED): Mod: JZ

## 2024-07-16 PROCEDURE — 2500000002 HC RX 250 W HCPCS SELF ADMINISTERED DRUGS (ALT 637 FOR MEDICARE OP, ALT 636 FOR OP/ED): Performed by: ANESTHESIOLOGY

## 2024-07-16 PROCEDURE — 3600000009 HC OR TIME - EACH INCREMENTAL 1 MINUTE - PROCEDURE LEVEL FOUR: Performed by: STUDENT IN AN ORGANIZED HEALTH CARE EDUCATION/TRAINING PROGRAM

## 2024-07-16 PROCEDURE — 23430 REPAIR BICEPS TENDON: CPT | Performed by: STUDENT IN AN ORGANIZED HEALTH CARE EDUCATION/TRAINING PROGRAM

## 2024-07-16 PROCEDURE — 7100000010 HC PHASE TWO TIME - EACH INCREMENTAL 1 MINUTE: Performed by: STUDENT IN AN ORGANIZED HEALTH CARE EDUCATION/TRAINING PROGRAM

## 2024-07-16 PROCEDURE — 2500000004 HC RX 250 GENERAL PHARMACY W/ HCPCS (ALT 636 FOR OP/ED)

## 2024-07-16 PROCEDURE — 3700000001 HC GENERAL ANESTHESIA TIME - INITIAL BASE CHARGE: Performed by: STUDENT IN AN ORGANIZED HEALTH CARE EDUCATION/TRAINING PROGRAM

## 2024-07-16 PROCEDURE — 2500000004 HC RX 250 GENERAL PHARMACY W/ HCPCS (ALT 636 FOR OP/ED): Performed by: STUDENT IN AN ORGANIZED HEALTH CARE EDUCATION/TRAINING PROGRAM

## 2024-07-16 PROCEDURE — C1776 JOINT DEVICE (IMPLANTABLE): HCPCS | Performed by: STUDENT IN AN ORGANIZED HEALTH CARE EDUCATION/TRAINING PROGRAM

## 2024-07-16 PROCEDURE — 2780000003 HC OR 278 NO HCPCS: Performed by: STUDENT IN AN ORGANIZED HEALTH CARE EDUCATION/TRAINING PROGRAM

## 2024-07-16 PROCEDURE — 7100000009 HC PHASE TWO TIME - INITIAL BASE CHARGE: Performed by: STUDENT IN AN ORGANIZED HEALTH CARE EDUCATION/TRAINING PROGRAM

## 2024-07-16 PROCEDURE — 64415 NJX AA&/STRD BRCH PLXS IMG: CPT | Performed by: ANESTHESIOLOGY

## 2024-07-16 PROCEDURE — C1713 ANCHOR/SCREW BN/BN,TIS/BN: HCPCS | Performed by: STUDENT IN AN ORGANIZED HEALTH CARE EDUCATION/TRAINING PROGRAM

## 2024-07-16 PROCEDURE — 73030 X-RAY EXAM OF SHOULDER: CPT | Mod: LT

## 2024-07-16 PROCEDURE — 2500000001 HC RX 250 WO HCPCS SELF ADMINISTERED DRUGS (ALT 637 FOR MEDICARE OP): Performed by: ANESTHESIOLOGY

## 2024-07-16 PROCEDURE — C1769 GUIDE WIRE: HCPCS | Performed by: STUDENT IN AN ORGANIZED HEALTH CARE EDUCATION/TRAINING PROGRAM

## 2024-07-16 PROCEDURE — 23472 RECONSTRUCT SHOULDER JOINT: CPT | Performed by: STUDENT IN AN ORGANIZED HEALTH CARE EDUCATION/TRAINING PROGRAM

## 2024-07-16 PROCEDURE — 2500000004 HC RX 250 GENERAL PHARMACY W/ HCPCS (ALT 636 FOR OP/ED): Performed by: NURSE ANESTHETIST, CERTIFIED REGISTERED

## 2024-07-16 PROCEDURE — 2500000001 HC RX 250 WO HCPCS SELF ADMINISTERED DRUGS (ALT 637 FOR MEDICARE OP)

## 2024-07-16 PROCEDURE — 23472 RECONSTRUCT SHOULDER JOINT: CPT

## 2024-07-16 PROCEDURE — 3600000004 HC OR TIME - INITIAL BASE CHARGE - PROCEDURE LEVEL FOUR: Performed by: STUDENT IN AN ORGANIZED HEALTH CARE EDUCATION/TRAINING PROGRAM

## 2024-07-16 PROCEDURE — 2720000007 HC OR 272 NO HCPCS: Performed by: STUDENT IN AN ORGANIZED HEALTH CARE EDUCATION/TRAINING PROGRAM

## 2024-07-16 PROCEDURE — 2500000004 HC RX 250 GENERAL PHARMACY W/ HCPCS (ALT 636 FOR OP/ED): Performed by: ANESTHESIOLOGY

## 2024-07-16 PROCEDURE — 23430 REPAIR BICEPS TENDON: CPT

## 2024-07-16 PROCEDURE — 7100000001 HC RECOVERY ROOM TIME - INITIAL BASE CHARGE: Performed by: STUDENT IN AN ORGANIZED HEALTH CARE EDUCATION/TRAINING PROGRAM

## 2024-07-16 PROCEDURE — 3700000002 HC GENERAL ANESTHESIA TIME - EACH INCREMENTAL 1 MINUTE: Performed by: STUDENT IN AN ORGANIZED HEALTH CARE EDUCATION/TRAINING PROGRAM

## 2024-07-16 PROCEDURE — 2500000005 HC RX 250 GENERAL PHARMACY W/O HCPCS: Performed by: NURSE ANESTHETIST, CERTIFIED REGISTERED

## 2024-07-16 PROCEDURE — A23472 PR RECONSTR TOTAL SHOULDER IMPLANT: Performed by: NURSE ANESTHETIST, CERTIFIED REGISTERED

## 2024-07-16 PROCEDURE — 7100000002 HC RECOVERY ROOM TIME - EACH INCREMENTAL 1 MINUTE: Performed by: STUDENT IN AN ORGANIZED HEALTH CARE EDUCATION/TRAINING PROGRAM

## 2024-07-16 PROCEDURE — 73030 X-RAY EXAM OF SHOULDER: CPT | Mod: LEFT SIDE | Performed by: RADIOLOGY

## 2024-07-16 PROCEDURE — A23472 PR RECONSTR TOTAL SHOULDER IMPLANT: Performed by: ANESTHESIOLOGY

## 2024-07-16 DEVICE — IMPLANTABLE DEVICE: Type: IMPLANTABLE DEVICE | Site: SHOULDER | Status: FUNCTIONAL

## 2024-07-16 DEVICE — DEPUY CMW 2 FAST SET BONE CEMENT 20G: Type: IMPLANTABLE DEVICE | Site: SHOULDER | Status: FUNCTIONAL

## 2024-07-16 DEVICE — GUIDE PIN, 3 X 75MM, STERILE: Type: IMPLANTABLE DEVICE | Site: SHOULDER | Status: FUNCTIONAL

## 2024-07-16 RX ORDER — MEPERIDINE HYDROCHLORIDE 25 MG/ML
12.5 INJECTION INTRAMUSCULAR; INTRAVENOUS; SUBCUTANEOUS EVERY 10 MIN PRN
Status: DISCONTINUED | OUTPATIENT
Start: 2024-07-16 | End: 2024-07-16 | Stop reason: HOSPADM

## 2024-07-16 RX ORDER — FENTANYL CITRATE 50 UG/ML
25 INJECTION, SOLUTION INTRAMUSCULAR; INTRAVENOUS EVERY 5 MIN PRN
Status: DISCONTINUED | OUTPATIENT
Start: 2024-07-16 | End: 2024-07-16 | Stop reason: HOSPADM

## 2024-07-16 RX ORDER — DIPHENHYDRAMINE HYDROCHLORIDE 50 MG/ML
12.5 INJECTION, SOLUTION INTRAMUSCULAR; INTRAVENOUS ONCE AS NEEDED
Status: DISCONTINUED | OUTPATIENT
Start: 2024-07-16 | End: 2024-07-16 | Stop reason: HOSPADM

## 2024-07-16 RX ORDER — ONDANSETRON HYDROCHLORIDE 2 MG/ML
4 INJECTION, SOLUTION INTRAVENOUS ONCE AS NEEDED
Status: DISCONTINUED | OUTPATIENT
Start: 2024-07-16 | End: 2024-07-16 | Stop reason: HOSPADM

## 2024-07-16 RX ORDER — ASPIRIN 81 MG/1
81 TABLET ORAL 2 TIMES DAILY
Qty: 30 TABLET | Refills: 0 | Status: SHIPPED | OUTPATIENT
Start: 2024-07-16 | End: 2024-07-31

## 2024-07-16 RX ORDER — FENTANYL CITRATE 50 UG/ML
50 INJECTION, SOLUTION INTRAMUSCULAR; INTRAVENOUS ONCE
Status: COMPLETED | OUTPATIENT
Start: 2024-07-16 | End: 2024-07-16

## 2024-07-16 RX ORDER — ONDANSETRON HYDROCHLORIDE 2 MG/ML
INJECTION, SOLUTION INTRAVENOUS AS NEEDED
Status: DISCONTINUED | OUTPATIENT
Start: 2024-07-16 | End: 2024-07-16

## 2024-07-16 RX ORDER — ACETAMINOPHEN 325 MG/1
975 TABLET ORAL ONCE
Status: COMPLETED | OUTPATIENT
Start: 2024-07-16 | End: 2024-07-16

## 2024-07-16 RX ORDER — KETOROLAC TROMETHAMINE 30 MG/ML
INJECTION, SOLUTION INTRAMUSCULAR; INTRAVENOUS AS NEEDED
Status: DISCONTINUED | OUTPATIENT
Start: 2024-07-16 | End: 2024-07-16

## 2024-07-16 RX ORDER — ONDANSETRON 4 MG/1
4 TABLET, FILM COATED ORAL EVERY 8 HOURS PRN
Qty: 20 TABLET | Refills: 0 | Status: SHIPPED | OUTPATIENT
Start: 2024-07-16 | End: 2024-07-23

## 2024-07-16 RX ORDER — CEFAZOLIN SODIUM 2 G/100ML
2 INJECTION, SOLUTION INTRAVENOUS ONCE
Status: COMPLETED | OUTPATIENT
Start: 2024-07-16 | End: 2024-07-16

## 2024-07-16 RX ORDER — HYDROMORPHONE HYDROCHLORIDE 0.2 MG/ML
0.2 INJECTION INTRAMUSCULAR; INTRAVENOUS; SUBCUTANEOUS EVERY 5 MIN PRN
Status: DISCONTINUED | OUTPATIENT
Start: 2024-07-16 | End: 2024-07-16 | Stop reason: HOSPADM

## 2024-07-16 RX ORDER — TRANEXAMIC ACID 100 MG/ML
INJECTION, SOLUTION INTRAVENOUS AS NEEDED
Status: DISCONTINUED | OUTPATIENT
Start: 2024-07-16 | End: 2024-07-16

## 2024-07-16 RX ORDER — SODIUM CHLORIDE, SODIUM LACTATE, POTASSIUM CHLORIDE, CALCIUM CHLORIDE 600; 310; 30; 20 MG/100ML; MG/100ML; MG/100ML; MG/100ML
100 INJECTION, SOLUTION INTRAVENOUS CONTINUOUS
Status: DISCONTINUED | OUTPATIENT
Start: 2024-07-16 | End: 2024-07-16 | Stop reason: HOSPADM

## 2024-07-16 RX ORDER — OXYCODONE HYDROCHLORIDE 5 MG/1
5 TABLET ORAL EVERY 4 HOURS PRN
Status: DISCONTINUED | OUTPATIENT
Start: 2024-07-16 | End: 2024-07-16 | Stop reason: HOSPADM

## 2024-07-16 RX ORDER — VANCOMYCIN HYDROCHLORIDE 1 G/20ML
INJECTION, POWDER, LYOPHILIZED, FOR SOLUTION INTRAVENOUS AS NEEDED
Status: DISCONTINUED | OUTPATIENT
Start: 2024-07-16 | End: 2024-07-16 | Stop reason: HOSPADM

## 2024-07-16 RX ORDER — IPRATROPIUM BROMIDE 0.5 MG/2.5ML
500 SOLUTION RESPIRATORY (INHALATION) ONCE
Status: DISCONTINUED | OUTPATIENT
Start: 2024-07-16 | End: 2024-07-16 | Stop reason: HOSPADM

## 2024-07-16 RX ORDER — GLYCOPYRROLATE 0.2 MG/ML
INJECTION INTRAMUSCULAR; INTRAVENOUS AS NEEDED
Status: DISCONTINUED | OUTPATIENT
Start: 2024-07-16 | End: 2024-07-16

## 2024-07-16 RX ORDER — PROPOFOL 10 MG/ML
INJECTION, EMULSION INTRAVENOUS AS NEEDED
Status: DISCONTINUED | OUTPATIENT
Start: 2024-07-16 | End: 2024-07-16

## 2024-07-16 RX ORDER — HYDRALAZINE HYDROCHLORIDE 20 MG/ML
5 INJECTION INTRAMUSCULAR; INTRAVENOUS EVERY 30 MIN PRN
Status: DISCONTINUED | OUTPATIENT
Start: 2024-07-16 | End: 2024-07-16 | Stop reason: HOSPADM

## 2024-07-16 RX ORDER — GABAPENTIN 300 MG/1
300 CAPSULE ORAL ONCE
Status: COMPLETED | OUTPATIENT
Start: 2024-07-16 | End: 2024-07-16

## 2024-07-16 RX ORDER — DOXYCYCLINE 100 MG/1
100 CAPSULE ORAL 2 TIMES DAILY
Qty: 14 CAPSULE | Refills: 0 | Status: SHIPPED | OUTPATIENT
Start: 2024-07-16 | End: 2024-07-23

## 2024-07-16 RX ORDER — ACETAMINOPHEN 500 MG
1000 TABLET ORAL EVERY 8 HOURS PRN
Qty: 60 TABLET | Refills: 1 | Status: SHIPPED | OUTPATIENT
Start: 2024-07-16 | End: 2024-08-05

## 2024-07-16 RX ORDER — ALBUTEROL SULFATE 0.83 MG/ML
2.5 SOLUTION RESPIRATORY (INHALATION) ONCE AS NEEDED
Status: COMPLETED | OUTPATIENT
Start: 2024-07-16 | End: 2024-07-16

## 2024-07-16 RX ORDER — CELECOXIB 200 MG/1
200 CAPSULE ORAL ONCE
Status: COMPLETED | OUTPATIENT
Start: 2024-07-16 | End: 2024-07-16

## 2024-07-16 RX ORDER — NEOSTIGMINE METHYLSULFATE 1 MG/ML
INJECTION INTRAVENOUS AS NEEDED
Status: DISCONTINUED | OUTPATIENT
Start: 2024-07-16 | End: 2024-07-16

## 2024-07-16 RX ORDER — OXYCODONE HYDROCHLORIDE 5 MG/1
5 TABLET ORAL EVERY 4 HOURS PRN
Qty: 15 TABLET | Refills: 0 | Status: SHIPPED | OUTPATIENT
Start: 2024-07-16 | End: 2024-07-19

## 2024-07-16 RX ORDER — ROCURONIUM BROMIDE 10 MG/ML
INJECTION, SOLUTION INTRAVENOUS AS NEEDED
Status: DISCONTINUED | OUTPATIENT
Start: 2024-07-16 | End: 2024-07-16

## 2024-07-16 RX ORDER — MIDAZOLAM HYDROCHLORIDE 1 MG/ML
2 INJECTION, SOLUTION INTRAMUSCULAR; INTRAVENOUS ONCE
Status: COMPLETED | OUTPATIENT
Start: 2024-07-16 | End: 2024-07-16

## 2024-07-16 RX ADMIN — FENTANYL CITRATE 50 MCG: 50 INJECTION INTRAMUSCULAR; INTRAVENOUS at 07:53

## 2024-07-16 RX ADMIN — ACETAMINOPHEN 975 MG: 325 TABLET ORAL at 06:40

## 2024-07-16 RX ADMIN — FENTANYL CITRATE 25 MCG: 50 INJECTION INTRAMUSCULAR; INTRAVENOUS at 10:36

## 2024-07-16 RX ADMIN — OXYCODONE HYDROCHLORIDE 5 MG: 5 TABLET ORAL at 11:49

## 2024-07-16 RX ADMIN — MIDAZOLAM 2 MG: 1 INJECTION INTRAMUSCULAR; INTRAVENOUS at 07:52

## 2024-07-16 RX ADMIN — CELECOXIB 200 MG: 200 CAPSULE ORAL at 06:40

## 2024-07-16 RX ADMIN — ALBUTEROL SULFATE 2.5 MG: 2.5 SOLUTION RESPIRATORY (INHALATION) at 10:36

## 2024-07-16 RX ADMIN — GABAPENTIN 300 MG: 300 CAPSULE ORAL at 06:40

## 2024-07-16 RX ADMIN — SODIUM CHLORIDE, SODIUM LACTATE, POTASSIUM CHLORIDE, AND CALCIUM CHLORIDE 100 ML/HR: 600; 310; 30; 20 INJECTION, SOLUTION INTRAVENOUS at 07:11

## 2024-07-16 SDOH — HEALTH STABILITY: MENTAL HEALTH: CURRENT SMOKER: 0

## 2024-07-16 ASSESSMENT — PAIN SCALES - GENERAL
PAINLEVEL_OUTOF10: 0 - NO PAIN
PAINLEVEL_OUTOF10: 4
PAINLEVEL_OUTOF10: 0 - NO PAIN
PAINLEVEL_OUTOF10: 0 - NO PAIN
PAINLEVEL_OUTOF10: 6
PAIN_LEVEL: 0
PAINLEVEL_OUTOF10: 4

## 2024-07-16 ASSESSMENT — PAIN - FUNCTIONAL ASSESSMENT
PAIN_FUNCTIONAL_ASSESSMENT: WONG-BAKER FACES
PAIN_FUNCTIONAL_ASSESSMENT: 0-10

## 2024-07-16 ASSESSMENT — PAIN DESCRIPTION - DESCRIPTORS
DESCRIPTORS: TINGLING
DESCRIPTORS: SORE

## 2024-07-16 NOTE — BRIEF OP NOTE
Date: 2024  OR Location: [facility]    Name: Chacho Vences, : 1955, Age: 69 y.o., MRN: [id number], Sex: male    Diagnosis  Pre-op Diagnosis      * Osteoarthritis of left shoulder, unspecified osteoarthritis type [M19.012]     * Biceps tendinitis, left [M75.22]     * Osteoarthritis of glenohumeral joint, left [M19.012] Post-op Diagnosis     * Osteoarthritis of left shoulder, unspecified osteoarthritis type [M19.012]     * Biceps tendinitis, left [M75.22]     * Osteoarthritis of glenohumeral joint, left [M19.012]     Procedures  1.  Left anatomic total shoulder arthroplasty  2.  Left shoulder biceps tenodesis    Surgeons      * Jose Velez - Primary    Resident/Fellow/Other Assistant:  Surgeons and Role:     * Gabrielle Lopresti, PA-C - [facility] Assist    Procedure Summary  Anesthesia: Anesthesia type not filed in the log.  ASA: II  Anesthesia Staff: Anesthesiologist: Yong Dowell MD  CRNA: GALILEO Sanchez-CRNA  Estimated Blood Loss: 25mL  Intra-op Medications:   Administrations occurring from 0745 to 0950 on 24:   Medication Name Total Dose   vancomycin (Vancocin) vial for injection 1 g   lactated Ringer's infusion 1,337.5 mL   ceFAZolin (Ancef) 2 g in dextrose (iso)  mL 2 g   fentaNYL PF (Sublimaze) injection 50 mcg 50 mcg   midazolam (Versed) injection 2 mg 2 mg              Anesthesia Record               Intraprocedure I/O Totals          Intake    Tranexamic Acid 0.00 mL    The total shown is the total volume documented since Anesthesia Start was filed.    Total Intake 0 mL          Specimen: No specimens collected     Staff:   Circulator: Malia  Scrub Person: Daiana  Scrub Person: Phillip    Findings: Severe left shoulder glenohumeral osteoarthritis and biceps tenosynovitis, intact rotator cuff    Complications:  None; patient tolerated the procedure well.     Disposition: PACU - hemodynamically stable.  Condition: stable  Specimens Collected: No specimens  collected  Attending Attestation: I was present and scrubbed for the entire procedure.    Jose Velez  Phone Number: [phone]

## 2024-07-16 NOTE — PERIOPERATIVE NURSING NOTE
Pt observed resting with eyes closed, VSS. Pt awakens easily, states soreness is still 6/10. Due to resp status, unsafe to continue with IV narc administration.

## 2024-07-16 NOTE — PERIOPERATIVE NURSING NOTE
Pt received from OR via cart, monniotrs on, oral airway noted, no O2. Pt sat 84-85%. O2 added, CRNA with jaw thrust. Pt slow respirations, remaining VSS. Operative extremity assessed, iceman place.   Pt repositioed, pt maintaining respirations with oral airway only, lung fields CTA.

## 2024-07-16 NOTE — PERIOPERATIVE NURSING NOTE
Xray completed, glasses returned/placed on pt.  Pt pain reassessed, states 6/10. Pt educated on post op pain expectations and interventions. Resp status improving with cont stimulation.

## 2024-07-16 NOTE — PERIOPERATIVE NURSING NOTE
Post op PNB completed, pt tolerated procedure without complaint or difficulty.  Pt denies any ringing in ears, metallic taste or other adverse S/S.  Pt tolerating PO intake. VSS.

## 2024-07-16 NOTE — ANESTHESIA PROCEDURE NOTES
Peripheral Block    Patient location during procedure: pre-op  Start time: 7/16/2024 9:34 AM  End time: 7/16/2024 9:34 AM  Reason for block: at surgeon's request and post-op pain management  Staffing  Performed: attending   Authorized by: Yong Dowell MD    Performed by: Yong Dowell MD  Preanesthetic Checklist  Completed: patient identified, IV checked, site marked, risks and benefits discussed, surgical consent, monitors and equipment checked, pre-op evaluation and timeout performed   Timeout performed at: 7/16/2024 9:35 AM  Peripheral Block  Patient position: laying flat  Prep: ChloraPrep  Patient monitoring: heart rate, cardiac monitor and continuous pulse ox  Block type: interscalene  Laterality: left  Injection technique: single-shot  Guidance: ultrasound guided  Needle  Needle type: short-bevel   Needle gauge: 22 G  Needle length: 5 cm  Needle localization: ultrasound guidance     image stored in chart  Test dose: negative  Assessment  Injection assessment: no paresthesia on injection, negative aspiration for heme, incremental injection and local visualized surrounding nerve on ultrasound  Paresthesia pain: none  Heart rate change: no  Slow fractionated injection: yes  Additional Notes  Ropivicaine 20 ml 0.5% with 5 mg decadron preservative free.    Patient able to flex and extend hand post nerve block.    Ultrasound guidance used-- able to visualize needle, Nerve roots and local anesthetic  filling around the nerve roots as it is injected.    Post op in PACU-- patient c/o pain on top of shoulder.  Block redone.  Ropivicaine 20 ml 0.5% with 5 mg decadron preservative free

## 2024-07-16 NOTE — ANESTHESIA PROCEDURE NOTES
Airway  Date/Time: 7/16/2024 8:12 AM  Urgency: elective    Airway not difficult    Staffing  Performed: CRNA   Authorized by: Yong Dowell MD    Performed by: GALILEO Sanchez-AYAN  Patient location during procedure: OR    Indications and Patient Condition  Indications for airway management: anesthesia and airway protection  Spontaneous Ventilation: absent  Sedation level: deep  Preoxygenated: yes  Patient position: sniffing  MILS maintained throughout  Mask difficulty assessment: 1 - vent by mask  No planned trial extubation    Final Airway Details  Final airway type: endotracheal airway      Successful airway: ETT  Cuffed: yes   Successful intubation technique: direct laryngoscopy  Facilitating devices/methods: intubating stylet  Endotracheal tube insertion site: oral  Blade: Irene  Blade size: #4  ETT size (mm): 7.5  Cormack-Lehane Classification: grade IIa - partial view of glottis  Placement verified by: chest auscultation and capnometry   Cuff volume (mL): 7  Measured from: lips  ETT to lips (cm): 22  Number of attempts at approach: 1  Ventilation between attempts: none  Number of other approaches attempted: 0    Additional Comments  Atraumatic intubation, dentition unchanged.

## 2024-07-16 NOTE — NURSING NOTE
Timeout performed bedside with Dr. Dowell at 1053 for post block.  Left shoulder correct site.  Block initiated at 1056  Block ended at 1058.  Patient tolerated well. Care of patient assumed by Tati LISA RN

## 2024-07-16 NOTE — OP NOTE
Left anatomic total shoulder arthroplasty, biceps tenodesis (L) Operative Note     Date: 2024  OR Location: [facility]    Name: Chacho Vences, : 1955, Age: 69 y.o., MRN: [id number], Sex: male    Diagnosis  Pre-op Diagnosis      * Osteoarthritis of left shoulder, unspecified osteoarthritis type [M19.012]     * Biceps tendinitis, left [M75.22]     * Osteoarthritis of glenohumeral joint, left [M19.012] Post-op Diagnosis     * Osteoarthritis of left shoulder, unspecified osteoarthritis type [M19.012]     * Biceps tendinitis, left [M75.22]     * Osteoarthritis of glenohumeral joint, left [M19.012]     Procedures  1.  Left anatomic total shoulder arthroplasty  2.  Left shoulder biceps tenodesis    Surgeons      * Jose Velez - Primary    Resident/Fellow/Other Assistant:  Surgeons and Role:     * Gabrielle Lopresti, PA-C - [facility]    Procedure Summary  Anesthesia: Anesthesia type not filed in the log.  ASA: II  Anesthesia Staff: Anesthesiologist: Yong Dowell MD  CRNA: GALILEO Sanchez-CRNA  Estimated Blood Loss: 25mL  Intra-op Medications:   Administrations occurring from 0745 to 0950 on 24:   Medication Name Total Dose   vancomycin (Vancocin) vial for injection 1 g   lactated Ringer's infusion 1,337.5 mL   ceFAZolin (Ancef) 2 g in dextrose (iso)  mL 2 g   fentaNYL PF (Sublimaze) injection 50 mcg 50 mcg   midazolam (Versed) injection 2 mg 2 mg              Anesthesia Record               Intraprocedure I/O Totals          Intake    Tranexamic Acid 0.00 mL    The total shown is the total volume documented since Anesthesia Start was filed.    Total Intake 0 mL          Specimen: No specimens collected     Staff:   Circulator: Malia  Scrub Person: Daiana  Scrub Person: Phillip     Drains and/or Catheters: * None in log *    Tourniquet Times:         Implants:  Implants       Type Name Action Serial No.      Joint Knee BONE CEMENT, CMW 2 , FAST SET, 20G - [id number] Implanted       Joint GUIDE PIN, 3 X 75MM, STERILE - [id number] Implanted      Joint GUIDE PIN, 3 X 75MM, STERILE - [id number] Implanted      Joint GLENOID, AEQUALIS, CORTILOC, PEGGED, MOD M40 - [id number] - [id number] Implanted [id number]     Joint SHOULDER SYSTEM, SIMPLICITI NUCLEUS SIZE 2 - [id number] - [id number] Implanted [id number]      TORNIER HUMERAL HEAD Implanted [id number]              Findings: Severe left shoulder glenohumeral osteoarthritis and biceps tendon synovitis, intact rotator cuff    Indications: Chacho Vences is an 69 y.o. male who is having surgery for left shoulder glenohumeral osteoarthritis, biceps tenosynovitis that failed extensive nonoperative management.  Imaging confirmed the above.  After long discussion with the patient, he elected to proceed with surgical intervention form of a left anatomic total shoulder arthroplasty and biceps tenodesis.  I thoroughly explained the risks and benefits as well as the expected postoperative timeline for the proposed procedure versus nonoperative management. Risks of this procedure include but are not limited to bleeding, infection, nerve injury, DVT and failure of repair or implant.  The patient expressed understanding and wished to proceed with surgical intervention.  All questions were answered. They were consented to the above procedure at bedside.    The patient was seen in the preoperative area. The risks, benefits, complications, treatment options, non-operative alternatives, expected recovery and outcomes were discussed with the patient. The possibilities of reaction to medication, pulmonary aspiration, injury to surrounding structures, bleeding, recurrent infection, the need for additional procedures, failure to diagnose a condition, and creating a complication requiring transfusion or operation were discussed with the patient. The patient concurred with the proposed plan, giving informed consent.  The site of surgery was properly noted/marked  if necessary per policy. The patient has been actively warmed in preoperative area. Preoperative antibiotics have been ordered and given within 1 hours of incision. Venous thrombosis prophylaxis have been ordered including bilateral sequential compression devices    Procedure Details:   The patient was identified in the preoperative holding area, as well as the operative extremity. The operative site was confirmed with the patient and signed with my initials. A regional block was performed by the anesthesia team. The patient was taken to the operating room. General anesthesia was performed by the anesthesia team. The patient was placed into the beach-chair position. All bony prominences were well padded. SCDs were placed on the bilateral lower extremities. The contralateral arm was placed in a well-padded arm bob. The left shoulder was prepped and draped in the usual sterile fashion and placed in a pneumatic arm bob. Preoperative antibiotics and tranexamic acid were administered by the anesthesia team prior to the start of the procedure. A preoperative timeout was performed confirming the correct, patient, site, side and procedure to be performed, all in the room were in agreement.    Preoperative left shoulder motion under anesthesia demonstrated 120 degrees of forward flexion, external rotation to 20 degrees, abduction to 70 degrees    A standard deltopectoral approach was utilized. Incision was made sharply through the skin and careful dissection was carried out down to the deltopectoral interval. The cephalic vein was retracted laterally and protected throughout the duration of the surgery. The clavipectoral fascia was then entered. A deltoid retractor was placed within the subdeltoid space.    The long head of the biceps tendon was intact, but with severe tendinosis. The proximal 0.5cm of the pectoralis tendon was tenotomized with the bovie cautery. The biceps tendon sheath was incised and the biceps was  tenodesed to the pectoralis tendon using a Dynacord suture in figure of eight fashion.    Crossing circumflex vessels could be seen at the inferior border of the subscapularis. These were coagulated with a bovie cautery and released.     The subscapularis bursa was removed and the subscapularis was visualized to be present and intact and a subscapularis peel was performed with the use of the bovie cautery. This was tagged with a 0 vicryl suture x2.     The humeral head was then dislocated anteriorly. The rotator cuff appeared to be intact and healthy without tear. There were severe arthritic changes within the humeral head and glenoid consistent with severe glenohumeral osteoarthritis. Osteophytes were removed with a rongeur and osteotome. The humeral neck cutting guide was pinned into place and the humeral head cut was made just inferior to the anatomic neck of the humerus.    The humerus was prepped with a head sizer based on the native humeral head and the central guide pin was inserted through the lateral cortex. The central reamer was then utilized followed by the nucleus punch with the cut protector cap in place and attention was turned to the glenoid.     Next, glenoid exposure was performed. Retractors were placed posteriorly and anteriorly about the glenoid. The biceps root and the entirety of the glenoid labrum were excised. The inferior capsule was released off the glenoid neck while protecting the axillary nerve. An inferior glenoid retractor was placed.   Excellent glenoid exposure was achieved.      The center guide pin for the glenoid was then drilled using the size medium standard guide to match the patient´s glenoid anatomy based on the preoperative plan and intra-operative evaluation. The glenoid was then gently reamed down to a concentric surface. The center peg  hole was drilled. The appropriate size guide was then inserted and positioned appropriately while the peripheral peg holes were  drilled. The trial glenoid was then placed and deemed to be flush against the glenoid bone surface. The glenoid was irrigated with the pulse lavage. The central peg and peripheral holes were dried with a clean lap sponge. Cement was prepared on the back table and injected into the peripheral peg holes using a syringe. The size medium glenoid was then inserted and malleted into place demonstrating excellent coverage and a secure fit.     The humerus was then presented anteriorly.  The humeral nucleus punch was previously inserted at 30 degrees of retroversion. The cut protector was removed and the trial humeral head was inserted. The shoulder was reduced and taken through a range of motion. Soft tissue tension appeared to be appropriate and there was no gross instability. The trial components were then removed.    Three small drill holes were made in the lesser tuberosity and the Niceloop sutures x3 were passed through the holes for later subscapularis repair.     The real humeral implants: size 2 nucleus and 50 x 19 humeral head were inserted with a secure fit and good coverage of the humeral head without overcoverage. The shoulder was then reduced, soft tissue tension was assessed, and stability was tested and deemed appropriate.    The subscapularis peel was then repaired to the lesser tuberosity using the Niceloops passed through the subscapularis tendon and secured to the tuberosity with a Nice knot. The rotator interval was then closed.     The wound was copiously irrigated with irrisept irrigation solution and pulse lavage. 1gram of Vancomycin powder was placed in the wound. The deltopectoral fascia was reapproximated with 0 Vicryl. The subcuticular layer was closed with buried interrupted 2-0 Vicryl, followed by 3-0 running monocryl for the skin dressed with Prineo Dermabond.  A sterile Mepilex silver dressing was applied. The patient was placed in a sling with an abduction pillow with a cryotherapy pad. The  patient was awoken from general anesthesia without any complications and transferred to the recovery room in stable condition. At the end of the procedure, all needle, lap and instrument counts were correct.     I was present and scrubbed for all critical portions of this case.    XR in the PACU demonstrated a left anatomic total shoulder arthroplasty in appropriate position, with no evidence of periprosthetic fracture or hardware complication.    POSTOPERATIVE PLAN: The patient will remain in the sling postoperatively and begin the postoperative total shoulder arthroplasty protocol. They will come out of the sling to perform Codman´s pendulums and passive elbow range of motion. They will be started on aspirin 81mg twice daily for DVT prophylaxis. They will be discharged to home on POD0. They will return to see me in the office in 2 weeks for their routine 2 week postoperative visit with XR left shoulder.    Gabrielle LoPresti, PA-C was present for the entire case.  Her assistance was necessary and critical to the successful completion of the procedure.  She provided skilled assistance with arm positioning, retraction, implant insertion, wound closure.  A qualified assistant was not available to perform her portion of the case.      Complications:  None; patient tolerated the procedure well.    Disposition: PACU - hemodynamically stable.  Condition: stable     Attending Attestation: I was present and scrubbed for the entire procedure.    Jose Velez  Phone Number: [phone]

## 2024-07-16 NOTE — ANESTHESIA POSTPROCEDURE EVALUATION
Patient: Chacho Vences    Procedure Summary       Date: 07/16/24 Room / Location: [facility] 05 / [facility]    Anesthesia Start: 0803 Anesthesia Stop: 1013    Procedure: Left anatomic total shoulder arthroplasty, biceps tenodesis (Left: Shoulder) Diagnosis:       Osteoarthritis of left shoulder, unspecified osteoarthritis type      Biceps tendinitis, left      Osteoarthritis of glenohumeral joint, left      (Osteoarthritis of left shoulder, unspecified osteoarthritis type [M19.012])      (Biceps tendinitis, left [M75.22])      (Osteoarthritis of glenohumeral joint, left [M19.012])    Surgeons: Jose Velez MD Responsible Provider: Yong Dowell MD    Anesthesia Type: general, regional ASA Status: 2            Anesthesia Type: general, regional    Vitals Value Taken Time   /70 07/16/24 1115   Temp 36 07/16/24 1137   Pulse 74 07/16/24 1115   Resp 19 07/16/24 1115   SpO2 94 % 07/16/24 1115       Anesthesia Post Evaluation    Patient location during evaluation: PACU  Patient participation: complete - patient participated  Level of consciousness: awake  Pain score: 0  Pain management: adequate  Multimodal analgesia pain management approach  Airway patency: patent  Two or more strategies used to mitigate risk of obstructive sleep apnea  Cardiovascular status: acceptable  Respiratory status: acceptable  Hydration status: acceptable  Postoperative Nausea and Vomiting: none        There were no known notable events for this encounter.     yes

## 2024-07-16 NOTE — ANESTHESIA PREPROCEDURE EVALUATION
Patient: Chacho Vences    Procedure Information       Date/Time: 07/16/24 0745    Procedure: Left anatomic total shoulder arthroplasty, biceps tenodesis (Left: Shoulder) - 1.5hr    Location: [facility] / [facility]    Surgeons: Jose Velez MD            Relevant Problems   Anesthesia (within normal limits)      Cardiac   (+) Hyperlipidemia   (+) Hypertension      Pulmonary (within normal limits)      Neuro (within normal limits)      GI (within normal limits)      /Renal   (+) BPH (benign prostatic hyperplasia)   (+) Prostate cancer (Multi)      Liver (within normal limits)      Endocrine (within normal limits)      Hematology (within normal limits)      Musculoskeletal   (+) Osteoarthritis of glenohumeral joint, left   (+) Osteoarthritis of left shoulder      HEENT (within normal limits)      ID (within normal limits)      Skin (within normal limits)      GYN (within normal limits)       Clinical information reviewed:   Tobacco  Allergies  Meds   Med Hx  Surg Hx   Fam Hx  Soc Hx      Allergies   Allergen Reactions    Lisinopril Cough     Prior to Admission medications    Medication Sig Start Date End Date Taking? Authorizing Provider   amLODIPine (Norvasc) 10 mg tablet Take 1 tablet (10 mg) by mouth once daily. 5/17/24 11/13/24 Yes Farhat Jones MD   cyanocobalamin (Vitamin B-12) 50 mcg tablet Take 1 tablet (50 mcg) by mouth once daily.   Yes Historical Provider, MD   losartan-hydrochlorothiazide (Hyzaar) 100-25 mg tablet Take 1 tablet by mouth once daily.  Patient taking differently: Take 0.5 tablets by mouth once daily. 5/6/24 1/31/25 Yes Farhat Jones MD   saccharomyces boulardii (Florastor) 250 mg capsule Take 1 capsule (250 mg) by mouth once daily.   Yes Historical Provider, MD   tadalafil (Cialis) 20 mg tablet Take 1 tablet (20 mg) by mouth once daily as needed for erectile dysfunction. 5/6/24  Yes Farhat Jones MD   tamsulosin (Flomax) 0.4 mg 24 hr capsule Take 1 capsule (0.4 mg) by  mouth 2 times a day. 5/6/24 1/31/25 Yes Farhat Jones MD   acetaminophen (Tylenol) 500 mg tablet Take 2 tablets (1,000 mg) by mouth every 8 hours if needed for mild pain (1 - 3) for up to 20 days. 7/16/24 8/5/24  Gabrielle Lopresti, PA-C   aspirin 81 mg EC tablet Take 1 tablet (81 mg) by mouth 2 times a day for 15 days. 7/16/24 7/31/24  Gabrielle Lopresti, PA-C   doxycycline (Monodox) 100 mg capsule Take 1 capsule (100 mg) by mouth 2 times a day for 7 days. To prevent infection 7/16/24 7/23/24  Gabrielle Lopresti, PA-C   ondansetron (Zofran) 4 mg tablet Take 1 tablet (4 mg) by mouth every 8 hours if needed for nausea or vomiting for up to 7 days. 7/16/24 7/23/24  Gabrielle Lopresti, PA-C   oxyCODONE (Roxicodone) 5 mg immediate release tablet Take 1 tablet (5 mg) by mouth every 4 hours if needed for severe pain (7 - 10) or moderate pain (4 - 6) for up to 3 days. 7/16/24 7/19/24  Gabrielle Lopresti, PA-C     Past Medical History:   Diagnosis Date    Abnormal finding of blood chemistry, unspecified 10/08/2015    Abnormal blood chemistry    Arthritis 04/2023    Cancer (Multi) 03/9/2023    HL (hearing loss) 01/2022    Hypertension O4/2021     Past Surgical History:   Procedure Laterality Date    CIRCUMCISION, PRIMARY  1955    HERNIA REPAIR  1960, 1983    OTHER SURGICAL HISTORY  07/20/2022    Inguinal hernia repair    OTHER SURGICAL HISTORY  07/20/2022    Nasal septoplasty    OTHER SURGICAL HISTORY  07/20/2022    Tonsillectomy    VASECTOMY  1990       NPO Detail:  NPO/Void Status  Date of Last Liquid: 07/16/24  Time of Last Liquid: 0500  Date of Last Solid: 07/15/24  Time of Last Solid: 2200  Time of Last Void: 0600         Physical Exam    Airway  Mallampati: II  TM distance: >3 FB  Neck ROM: full     Cardiovascular - normal exam     Dental - normal exam     Pulmonary - normal exam     Abdominal            Anesthesia Plan    History of general anesthesia?: yes  History of complications of general anesthesia?: no    ASA  2     general and regional   (Left interscalene nerve block)  The patient is not a current smoker.  Patient was not previously instructed to abstain from smoking on day of procedure.  Patient did not smoke on day of procedure.  Education provided regarding risk of obstructive sleep apnea.  intravenous induction   Anesthetic plan and risks discussed with patient.    Plan discussed with CRNA and CAA.

## 2024-07-17 ENCOUNTER — APPOINTMENT (OUTPATIENT)
Dept: RADIATION ONCOLOGY | Facility: CLINIC | Age: 69
End: 2024-07-17
Payer: MEDICARE

## 2024-07-17 ASSESSMENT — PAIN SCALES - GENERAL: PAINLEVEL_OUTOF10: 6

## 2024-07-29 ENCOUNTER — HOSPITAL ENCOUNTER (OUTPATIENT)
Dept: RADIOLOGY | Facility: CLINIC | Age: 69
Discharge: HOME | End: 2024-07-29
Payer: MEDICARE

## 2024-07-29 ENCOUNTER — APPOINTMENT (OUTPATIENT)
Dept: ORTHOPEDIC SURGERY | Facility: CLINIC | Age: 69
End: 2024-07-29
Payer: MEDICARE

## 2024-07-29 VITALS — BODY MASS INDEX: 31.07 KG/M2 | HEIGHT: 68 IN | WEIGHT: 205 LBS

## 2024-07-29 DIAGNOSIS — Z96.612 STATUS POST TOTAL SHOULDER ARTHROPLASTY, LEFT: ICD-10-CM

## 2024-07-29 PROCEDURE — 73030 X-RAY EXAM OF SHOULDER: CPT | Mod: LT

## 2024-07-29 PROCEDURE — 1159F MED LIST DOCD IN RCRD: CPT | Performed by: STUDENT IN AN ORGANIZED HEALTH CARE EDUCATION/TRAINING PROGRAM

## 2024-07-29 PROCEDURE — 1036F TOBACCO NON-USER: CPT | Performed by: STUDENT IN AN ORGANIZED HEALTH CARE EDUCATION/TRAINING PROGRAM

## 2024-07-29 PROCEDURE — 3008F BODY MASS INDEX DOCD: CPT | Performed by: STUDENT IN AN ORGANIZED HEALTH CARE EDUCATION/TRAINING PROGRAM

## 2024-07-29 PROCEDURE — 99024 POSTOP FOLLOW-UP VISIT: CPT | Performed by: STUDENT IN AN ORGANIZED HEALTH CARE EDUCATION/TRAINING PROGRAM

## 2024-07-29 PROCEDURE — 73030 X-RAY EXAM OF SHOULDER: CPT | Mod: LEFT SIDE | Performed by: RADIOLOGY

## 2024-07-29 PROCEDURE — 1160F RVW MEDS BY RX/DR IN RCRD: CPT | Performed by: STUDENT IN AN ORGANIZED HEALTH CARE EDUCATION/TRAINING PROGRAM

## 2024-07-29 ASSESSMENT — PAIN - FUNCTIONAL ASSESSMENT: PAIN_FUNCTIONAL_ASSESSMENT: NO/DENIES PAIN

## 2024-07-29 NOTE — PROGRESS NOTES
PRIMARY CARE PHYSICIAN: Farhat Jones MD    ORTHOPAEDIC POSTOPERATIVE VISIT    ASSESSMENT & PLAN    Impression: 69 y.o. male 2 weeks postop s/p left anatomic total shoulder arthroplasty, biceps tenodesis done 7/16/2024    Plan:   He is doing very well.  He will begin working with physical therapy through the postoperative protocol for the above.  I again reviewed his postoperative precautions with him.  He expressed understanding.  He will continue with his sling for another 1 to 2 weeks for the postoperative protocol.    I reviewed the intraoperative findings with the patient and answered all their questions. I reviewed their postoperative timeline and plan with them. They understand the postoperative precautions and the treatment plan going forward.     Follow-Up: Patient will follow-up 4 weeks with x-rays left shoulder    At the end of the visit, all questions were answered in full. The patient is in agreement with the plan and recommendations. They will call the office with any questions/concerns.      Note dictated with [employer] software. Completed without full typed error editing and sent to avoid delay.      SUBJECTIVE  CHIEF COMPLAINT:   Chief Complaint   Patient presents with    Left Shoulder - Post-op, Follow-up        HPI: Chacho Vences is a 69 y.o. patient who is now 2 weeks postop status post left anatomic total shoulder arthroplasty, biceps tenodesis done 7/16/2024. He is currently immobilized in a sling. He is doing well and has a concerns about his stamina being low since Sx. XR done today.    REVIEW OF SYSTEMS  Constitutional: See HPI for pain assessment, No significant weight loss, recent trauma  Cardiovascular: No chest pain, shortness of breath  Respiratory: No difficulty breathing, cough  Gastrointestinal: No nausea, vomiting, diarrhea, constipation  Musculoskeletal: Noted in HPI, positive for pain, restricted motion, stiffness  Integumentary: No rashes, easy bruising,  redness   Neurological: no numbness or tingling in extremities, no gait disturbances   Psychiatric: No mood changes, memory changes, social issues  Heme/Lymph: no excessive swelling, easy bruising, excessive bleeding  ENT: No hearing changes  Eyes: No vision changes    CURRENT MEDICATIONS:   Current Outpatient Medications   Medication Sig Dispense Refill    acetaminophen (Tylenol) 500 mg tablet Take 2 tablets (1,000 mg) by mouth every 8 hours if needed for mild pain (1 - 3) for up to 20 days. 60 tablet 1    amLODIPine (Norvasc) 10 mg tablet Take 1 tablet (10 mg) by mouth once daily. 90 tablet 1    aspirin 81 mg EC tablet Take 1 tablet (81 mg) by mouth 2 times a day for 15 days. 30 tablet 0    cyanocobalamin (Vitamin B-12) 50 mcg tablet Take 1 tablet (50 mcg) by mouth once daily.      losartan-hydrochlorothiazide (Hyzaar) 100-25 mg tablet Take 1 tablet by mouth once daily. (Patient taking differently: Take 0.5 tablets by mouth once daily.) 90 tablet 2    saccharomyces boulardii (Florastor) 250 mg capsule Take 1 capsule (250 mg) by mouth once daily.      tadalafil (Cialis) 20 mg tablet Take 1 tablet (20 mg) by mouth once daily as needed for erectile dysfunction. 30 tablet 1    tamsulosin (Flomax) 0.4 mg 24 hr capsule Take 1 capsule (0.4 mg) by mouth 2 times a day. 180 capsule 2     No current facility-administered medications for this visit.        OBJECTIVE    PHYSICAL EXAM      7/16/2024    10:04 AM 7/16/2024    10:20 AM 7/16/2024    10:35 AM 7/16/2024    10:50 AM 7/16/2024    11:00 AM 7/16/2024    11:15 AM 7/16/2024    11:34 AM   Vitals   Systolic 121 119 112 127 115 119 124   Diastolic 62 60 60 68 67 70 67   Heart Rate 62 73 74 78 75 74 71   Temp       36.6 °C (97.9 °F)   Resp 8 14 15 20 19 19 18      There is no height or weight on file to calculate BMI.    General: Well-appearing, no acute distress    Skin intact bilateral upper and lower extremities  No erythema  No warmth    Detailed examination of the left  shoulder demonstrates:  Surgical incision(s) healing well, Steri-Strips in place  No erythema or warmth  No drainage  No ecchymosis  Resolving swelling, minimal  Biceps contour normal  Shoulder range of motion: Forward flexion 90, ER to neutral  Upper extremity motor grossly intact  C5-T1 sensation intact bilaterally  2+ radial pulses bilaterally  Warm and well-perfused, brisk capillary refill    IMAGING: X-ray views of the left shoulder reviewed by me demonstrate anatomic total shoulder arthroplasty in place evidence of implant loosening or hardware complication, no dislocation      Jose Velez MD  Attending Surgeon    Sports Medicine Orthopaedic Surgery  [facility] [place]  [facility] [employer]

## 2024-08-01 ENCOUNTER — EVALUATION (OUTPATIENT)
Dept: PHYSICAL THERAPY | Facility: CLINIC | Age: 69
End: 2024-08-01
Payer: MEDICARE

## 2024-08-01 DIAGNOSIS — M25.612 SHOULDER STIFFNESS, LEFT: ICD-10-CM

## 2024-08-01 DIAGNOSIS — Z96.612 STATUS POST TOTAL SHOULDER ARTHROPLASTY, LEFT: ICD-10-CM

## 2024-08-01 DIAGNOSIS — M25.512 ACUTE PAIN OF LEFT SHOULDER: Primary | ICD-10-CM

## 2024-08-01 PROCEDURE — 97110 THERAPEUTIC EXERCISES: CPT | Mod: GP

## 2024-08-01 PROCEDURE — 97161 PT EVAL LOW COMPLEX 20 MIN: CPT | Mod: GP

## 2024-08-01 ASSESSMENT — ENCOUNTER SYMPTOMS
DEPRESSION: 0
LOSS OF SENSATION IN FEET: 0
OCCASIONAL FEELINGS OF UNSTEADINESS: 0

## 2024-08-01 NOTE — PROGRESS NOTES
Physical Therapy Evaluation    Subjective:  Patient Name: Chacho Vences  MRN: [id number]  Today's Date: 8/1/2024  Visit: 1/MN  Referred by:  Jose Velez  Time Calculation  Start Time: 1500  Stop Time: 1545  Time Calculation (min): 45 min  Diagnosis:   1. Acute pain of left shoulder  Follow Up In Physical Therapy      2. Status post total shoulder arthroplasty, left  Referral to Physical Therapy    Follow Up In Physical Therapy      3. Shoulder stiffness, left  Follow Up In Physical Therapy        Mechanism of Injury:  Patient is s/p L anatomic TSA with biceps tenodesis 7/16/24  Location: posterior, lateral, and at times info forearm  Pain Rating: nagging 3-4/10  Increased pain: sleeping  Decreased pain: icing prn  R handed    Current Medical Management: post-op  Precautions: see protocol (PROM intially 1st 2 weeks)  Functional Limitations: lifting  playing with grandkids, golfing, pickle ball  Prior Level of Function: indep with IADLs  Work Status: wealth advisor- part time.  Works from home on computer  Living Environment:  no issues  Social Support: wife  Personal Factors that may impact care: none    Objective:  Patient arrived wearing sling.  Adjusted for increased comfort    Shoulder PROM (L):  Flexion= 115  Abduction= NT  Extension= NT  IR= to stomach  ER= 40  Elbow Flexion= WNL  Elbow Extension= WNL  Wrist flexion/ext= WNL  Forearm supination= WNL    Shoulder Strength (R/L)= NT due to surgery   strength= (R/L)= 59/46  Wrist flexion/ext= 5/5    SPADI= 28% impaired    Treatment Performed Today:  Instructed in and issued for HEP:  Scapular retractions, wand flexion, ER, and pendulums    Assessment:  68 yo M s/p L TSA with biceps tenodesis 7/16/24.  Presents with deficits consistent with stage of healing.  Would benefit from PT to address deficits to allow full return to functional/leisure activity.  Problem List:  Shoulder pain, decreased shoulder ROM, strength, decreased function/leisure  Level of  Complexity:  low  Plan:  -Goals:   Active       PT Problem       Improve SPADI by at least 20%       Start:  08/01/24    Expected End:  10/30/24            Able to return to leisure (pickle ball, golfing)       Start:  08/01/24    Expected End:  11/29/24            Indep in IADLs       Start:  08/01/24    Expected End:  10/30/24            Increase UE strength to at least 4+/5       Start:  08/01/24    Expected End:  10/30/24            Increase L shoulder flexion and abd to 160, ER to 45       Start:  08/01/24    Expected End:  10/30/24              -Frequency & Duration:   2x/week x 6-8 weeks  -Rehab Potential:   good  Plan of care was developed with input and agreement of the patient.    Billing:  PT Evaluation Time Entry  PT Evaluation (Low) Time Entry: 30  PT Therapeutic Procedures Time Entry  Therapeutic Exercise Time Entry: 15

## 2024-08-08 ENCOUNTER — APPOINTMENT (OUTPATIENT)
Dept: PHYSICAL THERAPY | Facility: CLINIC | Age: 69
End: 2024-08-08
Payer: MEDICARE

## 2024-08-08 ENCOUNTER — TREATMENT (OUTPATIENT)
Dept: PHYSICAL THERAPY | Facility: CLINIC | Age: 69
End: 2024-08-08
Payer: MEDICARE

## 2024-08-08 DIAGNOSIS — Z96.612 STATUS POST TOTAL SHOULDER ARTHROPLASTY, LEFT: ICD-10-CM

## 2024-08-08 DIAGNOSIS — M25.612 SHOULDER STIFFNESS, LEFT: Primary | ICD-10-CM

## 2024-08-08 PROCEDURE — 97110 THERAPEUTIC EXERCISES: CPT | Mod: GP

## 2024-08-08 PROCEDURE — 97140 MANUAL THERAPY 1/> REGIONS: CPT | Mod: GP

## 2024-08-08 NOTE — PROGRESS NOTES
"Physical Therapy Treatment  Patient Name: Chacho Vences  MRN: [id number]  Today's Date: 8/8/2024   Visit 2/MNTime Calculation  Start Time: 1250  Stop Time: 1334  Time Calculation (min): 44 min  Referred by:   Diagnosis:   1. Shoulder stiffness, left        2. Status post total shoulder arthroplasty, left          PRECAUTIONS:  Patient is s/p L anatomic TSA with biceps tenodesis 7/16/24    SUBJECTIVE:  No c/o pain. Pt reports compliance with HEP.  No falls or changes in medical status since last visit per patient report.  R handed  Pt reports he has been lifting items with L UE even with sling on.    OBJECTIVE:  Pt presents without sling  Shoulder PROM (L):  Flexion= 12  Elevation scapular plane= 108  Extension= NT  IR= to stomach  ER= 40    TREATMENT:  - Therex:   Pendulum: A/P, R/L, CW/CCW x30 each  Supine AAROM cane  flexion 3x10  Supine AAROM cane ER is scapular plane 3x10  Supine AAROM cane scaption 3x10    Seated Scapular retractions 10\" x15    Sub max, pain free Isometrics 25% or less  Flex 5\" x12  ABD 5\" x12  ER 5\"x12    Seated PRE Wrist Ext/Flexion 3# 2x10 each    - Manual Therapy:  Gentle PROM     Education:  Pt education on: rehab protocol, pain free exercises, caution with HEP and in therapy and to not progress to quickly, no AROM of LUE, no lifting with LUE, pain management with ice after session or ex., he is in the protection phase and he is to progress slowly and to Continue to wear sling per MD last note to end of week.   - Patient education on new HEP with demonstration and practice. Handout issued. Pt reports understanding. Pt told to work in gentle small pain free intensity and to stop if there is pain.     Assessment:   S/p TSA 3 wks and 2 days Pt tolerated session progression well and required prompts for proper performance of skilled PT exercises and posture. Pt appears he has not been compliant with protocol restrictions.  Pt has good PROM without overpressure but needs to be cautious to no " do too much too soon.     PLAN: Daily HEP, ice prn  Consider adding towel table AAROM, supine punches, supine salutes and scapular retraction with depression. Reevaluate tolerance to isometrics.        Billing:     PT Therapeutic Procedures Time Entry  Manual Therapy Time Entry: 10  Therapeutic Exercise Time Entry: 25  Self-Care/Home Mgmt Trainin              Non-Billable Time  Non-billable time: 4

## 2024-08-14 ENCOUNTER — TREATMENT (OUTPATIENT)
Dept: PHYSICAL THERAPY | Facility: CLINIC | Age: 69
End: 2024-08-14
Payer: MEDICARE

## 2024-08-14 DIAGNOSIS — Z96.612 STATUS POST TOTAL SHOULDER ARTHROPLASTY, LEFT: ICD-10-CM

## 2024-08-14 DIAGNOSIS — M25.512 ACUTE PAIN OF LEFT SHOULDER: ICD-10-CM

## 2024-08-14 DIAGNOSIS — M25.612 SHOULDER STIFFNESS, LEFT: Primary | ICD-10-CM

## 2024-08-14 PROCEDURE — 97140 MANUAL THERAPY 1/> REGIONS: CPT | Mod: GP,CQ | Performed by: PHYSICAL THERAPY ASSISTANT

## 2024-08-14 PROCEDURE — 97110 THERAPEUTIC EXERCISES: CPT | Mod: GP,CQ | Performed by: PHYSICAL THERAPY ASSISTANT

## 2024-08-14 NOTE — PROGRESS NOTES
"Physical Therapy Treatment  Patient Name: Chacho Vences  MRN: [id number]  Today's Date: 8/14/2024   Visit 3/MN   Referred by:   Diagnosis:   1. Shoulder stiffness, left        2. Status post total shoulder arthroplasty, left        3. Acute pain of left shoulder            PRECAUTIONS:  Patient is s/p L anatomic TSA with biceps tenodesis 7/16/24    SUBJECTIVE:  Pt reports no pain entering therapy.     OBJECTIVE:  L shld PROM  Flex 130   ER 60  IR 80    [id number]:  - Therex:  Overhead pulley, scaption 2x10  Seated bolster roll on table 5\"-10\" x20   Pendulum: A/P, R/L, CW/CCW x30 each  Stand SB roll on table AA with R UE, fwd/bkw,cw,ccw x20ea  Supine AAROM cane  flexion 3x10  Supine AAROM cane ER is scapular plane 3x10  Supine AAROM cane scaption 3x10-np    Seated Scapular retractions 10\" x15    Sub max, pain free Isometrics 25% or less  Flex 5\" x10  ABD 5\" x10  ER 5\"x10    Seated PRE Wrist Ext/Flexion 3# 2x10 each    - Manual Therapy:  Gentle PROM     Education:    Assessment:    Very good ese to exercises. No pain reported with session. ROM progressing quickly. Advanced with AA activities in which he ese well.     PLAN: Daily HEP, ice prn  Consider adding towel table AAROM, supine punches, supine salutes and scapular retraction with depression. Reevaluate tolerance to isometrics. (Cont)        Billing:                         "

## 2024-08-16 ENCOUNTER — TREATMENT (OUTPATIENT)
Dept: PHYSICAL THERAPY | Facility: CLINIC | Age: 69
End: 2024-08-16
Payer: MEDICARE

## 2024-08-16 DIAGNOSIS — M25.512 ACUTE PAIN OF LEFT SHOULDER: ICD-10-CM

## 2024-08-16 DIAGNOSIS — Z96.612 STATUS POST TOTAL SHOULDER ARTHROPLASTY, LEFT: Primary | ICD-10-CM

## 2024-08-16 PROCEDURE — 97110 THERAPEUTIC EXERCISES: CPT | Mod: GP

## 2024-08-16 NOTE — PROGRESS NOTES
"Physical Therapy Treatment  Patient Name: Chacho Vences  MRN: [id number]  Today's Date: 8/16/2024   Visit 4/MNTime Calculation  Start Time: 0817  Stop Time: 0910  Time Calculation (min): 53 min  Referred by:   Diagnosis:   1. Status post total shoulder arthroplasty, left        2. Acute pain of left shoulder              PRECAUTIONS:  Patient is s/p L anatomic TSA with biceps tenodesis 7/16/24    SUBJECTIVE:  Pt reports mild soreness in left arm from sleeping on it 3/10.     OBJECTIVE:  Pt present with sling on today  Poor endurance of periscapular musculature  L shld PROM  Flex 130   ER 60  IR 80    [id number]:  - Therex:  Overhead pulley, flexion 2 min  standing  Seated/standing  bolster roll on table 5\"-10\" x20  Stand SB roll on table AA with R UE, fwd/bkw,cw,ccw x20ea    Supine AAROM cane  flexion 3x10  Supine AAROM cane scaption 3x10   Supine salute x20  Supine AROM flex to  ~90 degrees  Sidelying hoz ADD - parallel to  mid range ADD    Prone Scapular retractions 5 sec x15  Prone retraction with depression 5 sec x15    Seated AAROM cane ER is scapular plane 3x10 to ~30 degrees  Standing Sub max, pain free Isometrics 25% or less  Flex 5\" x10  ABD 5\" x10  ER 5\"x10    Seated PRE Wrist Ext/Flexion 3# 2x10 each - NP    HEP updated   Pendulum: A/P, R/L, CW/CCW    Seated scap set   Seated scap retract and depression  Isometric submax:  ER, Flex, ABD  5sec x10 25% effort  every other day pain free  AAROM cane:  seat ER, supine flexion and supine scaption    MODALITIES  CP to L shoulder at end of session for soreness seated 10 min    Education:  Update HEP    Assessment:  S/p 4 wk 4 day  L anatomic TSA with biceps tenodesis 7/16/24. Pt notes general soreness with all motions this am. Pt challenged by prone scapular retraction exercises.   ROM progressing quickly; pt cautioned to not push AAROM ROM too hard. Advanced with AA and A activities in which he ese well.     PLAN: Daily HEP, ice prn      Billing:     PT " Therapeutic Procedures Time Entry  Therapeutic Exercise Time Entry: 38  Self-Care/Home Mgmt Trainin

## 2024-08-20 ENCOUNTER — TREATMENT (OUTPATIENT)
Dept: PHYSICAL THERAPY | Facility: CLINIC | Age: 69
End: 2024-08-20
Payer: MEDICARE

## 2024-08-20 DIAGNOSIS — M25.512 ACUTE PAIN OF LEFT SHOULDER: ICD-10-CM

## 2024-08-20 DIAGNOSIS — Z96.612 STATUS POST TOTAL SHOULDER ARTHROPLASTY, LEFT: Primary | ICD-10-CM

## 2024-08-20 DIAGNOSIS — M25.612 SHOULDER STIFFNESS, LEFT: ICD-10-CM

## 2024-08-20 PROCEDURE — 97110 THERAPEUTIC EXERCISES: CPT | Mod: GP,CQ | Performed by: PHYSICAL THERAPY ASSISTANT

## 2024-08-20 PROCEDURE — 97140 MANUAL THERAPY 1/> REGIONS: CPT | Mod: GP,CQ | Performed by: PHYSICAL THERAPY ASSISTANT

## 2024-08-20 NOTE — PROGRESS NOTES
"Physical Therapy Treatment  Patient Name: Chacho Vences  MRN: [id number]  Today's Date: 8/20/2024   Visit 5/MNTime Calculation  Start Time: 1115  Stop Time: 1200  Time Calculation (min): 45 min  Referred by:   Diagnosis:   1. Status post total shoulder arthroplasty, left        2. Acute pain of left shoulder        3. Shoulder stiffness, left                PRECAUTIONS:  Patient is s/p L anatomic TSA with biceps tenodesis 7/16/24    SUBJECTIVE:  Pt reports mild soreness in left arm from sleeping on it 3/10.     OBJECTIVE:  L shld AROM (stand)  Flex 115  ER occiput region  IR L5 region    TREATMENT:  - Therex:  UBE 2'/2'  Overhead pulley, seated flexion 2 min   IR-pulley 5\"x20  Wall slides x20  Seated/standing  bolster roll on table 5\"-10\" x20  Stand SB roll on table L only, fwd/bkw,cw,ccw x20ea    Supine AAROM cane  flexion 3x10  Supine wand ER 5\" x20  Supine AROM flex to  ~90 degrees  Sidelying hoz ADD - parallel to  mid range ADD    Prone Scapular retractions 5 sec x15-np  Prone retraction with depression 5 sec x15-np    Standing Sub max, pain free Isometrics 25% or less  Flex 5\" x10  ABD 5\" x10  ER 5\"x10    Seated PRE Wrist Ext/Flexion 3# 2x10 each - NP    MODALITIES      Education:  Update HEP    Assessment:   Cont to make steady progressions toward goals. ROM is progressing very well with pain being min and manageable with session. I encouraged him to be patient with the process as he would like to return to certain ADL's like golf and pickle ball.     PLAN:   Daily HEP, ice prn      Billing:     PT Therapeutic Procedures Time Entry  Manual Therapy Time Entry: 10  Therapeutic Exercise Time Entry: 35                   "

## 2024-08-22 ENCOUNTER — TREATMENT (OUTPATIENT)
Dept: PHYSICAL THERAPY | Facility: CLINIC | Age: 69
End: 2024-08-22
Payer: MEDICARE

## 2024-08-22 DIAGNOSIS — M25.512 ACUTE PAIN OF LEFT SHOULDER: ICD-10-CM

## 2024-08-22 DIAGNOSIS — Z96.612 STATUS POST TOTAL SHOULDER ARTHROPLASTY, LEFT: ICD-10-CM

## 2024-08-22 DIAGNOSIS — M25.612 SHOULDER STIFFNESS, LEFT: ICD-10-CM

## 2024-08-22 PROCEDURE — 97110 THERAPEUTIC EXERCISES: CPT | Mod: GP,CQ | Performed by: PHYSICAL THERAPY ASSISTANT

## 2024-08-22 PROCEDURE — 97140 MANUAL THERAPY 1/> REGIONS: CPT | Mod: GP,CQ | Performed by: PHYSICAL THERAPY ASSISTANT

## 2024-08-22 NOTE — PROGRESS NOTES
"Physical Therapy Treatment  Patient Name: Chacho Vences  MRN: [id number]  Today's Date: 8/22/2024   Visit 6/MNTime Calculation  Start Time: 1300  Stop Time: 1345  Time Calculation (min): 45 min  Referred by:   Diagnosis:   1. Status post total shoulder arthroplasty, left  Follow Up In Physical Therapy      2. Acute pain of left shoulder  Follow Up In Physical Therapy      3. Shoulder stiffness, left  Follow Up In Physical Therapy              PRECAUTIONS:  Patient is s/p L anatomic TSA with biceps tenodesis 7/16/24    SUBJECTIVE:  Pt reports no particular pain just sore today.     OBJECTIVE:  Can actively elevate hand to shld level w/o compensation.   TREATMENT:  - Therex:  UBE 2'/2'  Overhead pulley, seated flexion 2 min   IR-pulley 5\"x20  Wall slides x20  Seated/standing  bolster roll on table 5\"-10\" x20  Stand SB roll on table L only, fwd/bkw,cw,ccw x20ea  Stand wand flex 2x10    Supine AAROM cane  flexion 3x10  Supine wand ER 5\" x20    Standing Sub max, pain free Isometrics 25% or less  Flex 5\" x10  ABD 5\" x10  ER 5\"x10    NP  Supine AROM flex to  ~90 degrees  Sidelying hoz ADD - parallel to  mid range ADD    Prone Scapular retractions 5 sec x15-np  Prone retraction with depression 5 sec x15-np    Seated PRE Wrist Ext/Flexion 3# 2x10 each - NP    MANUAL:  L shld PROM, x10'    MODALITIES      Education:  Update HEP    Assessment:   Progressing very well. Pain negligible with session. No compensation presented with today's session.     PLAN:   Daily HEP, ice prn      Billing:     PT Therapeutic Procedures Time Entry  Manual Therapy Time Entry: 10  Therapeutic Exercise Time Entry: 35                   "

## 2024-08-27 ENCOUNTER — TREATMENT (OUTPATIENT)
Dept: PHYSICAL THERAPY | Facility: CLINIC | Age: 69
End: 2024-08-27
Payer: MEDICARE

## 2024-08-27 DIAGNOSIS — M25.512 ACUTE PAIN OF LEFT SHOULDER: ICD-10-CM

## 2024-08-27 DIAGNOSIS — M25.612 SHOULDER STIFFNESS, LEFT: ICD-10-CM

## 2024-08-27 DIAGNOSIS — Z96.612 STATUS POST TOTAL SHOULDER ARTHROPLASTY, LEFT: Primary | ICD-10-CM

## 2024-08-27 PROCEDURE — 97110 THERAPEUTIC EXERCISES: CPT | Mod: GP

## 2024-08-27 NOTE — PROGRESS NOTES
Physical Therapy Treatment  Patient Name: Chacho Vences  MRN: [id number]  Today's Date: 8/27/2024   Visit 7/MNTime Calculation  Start Time: 1200  Stop Time: 1245  Time Calculation (min): 45 min    PRECAUTIONS:  Patient is s/p L anatomic TSA with biceps tenodesis 7/16/24    SUBJECTIVE:  Pt reports soreness, not pain, but feels is still pretty tight.    OBJECTIVE:  Shoulder AROM/PROM (L):  Flexion= 132/145  Abduction= 65/100  ER= 55/70  IR= 60 PROM  Shoulder hiking with scaption initially    TREATMENT:  - Therex:  UBE 2'/2'  Overhead pulley flexion x 2 min   IR-pulley x 1 min  Active scaption x 15 reps in front of mirror to avoid shoulder hiking  Active shoulder ER x 15 reps  Wall slides x20  Sidelying ER and abduction x 15 reps each    Current HEP: scapular retractions, stick revolution, isometric flexion, IR, and ER, wand ER.- DCd these  Changed to the following: wall shoulder flexion, scaption, s/l ER and abduction  MANUAL:  L shld PROM    Education:  Updated HEP    Assessment:   Patient progressing well.  Minimal pain.  ROM progressing as expected.     PLAN:   Continue with focus on improving AROM and gradually add strengthening as appropriate.  Follow up with surgeon tomorrow.      Billing:     PT Therapeutic Procedures Time Entry  Therapeutic Exercise Time Entry: 45

## 2024-08-28 ENCOUNTER — HOSPITAL ENCOUNTER (OUTPATIENT)
Dept: RADIOLOGY | Facility: CLINIC | Age: 69
Discharge: HOME | End: 2024-08-28
Payer: MEDICARE

## 2024-08-28 ENCOUNTER — APPOINTMENT (OUTPATIENT)
Dept: ORTHOPEDIC SURGERY | Facility: CLINIC | Age: 69
End: 2024-08-28
Payer: MEDICARE

## 2024-08-28 DIAGNOSIS — Z96.612 STATUS POST TOTAL SHOULDER ARTHROPLASTY, LEFT: ICD-10-CM

## 2024-08-28 PROCEDURE — 1160F RVW MEDS BY RX/DR IN RCRD: CPT

## 2024-08-28 PROCEDURE — 73030 X-RAY EXAM OF SHOULDER: CPT | Mod: LT

## 2024-08-28 PROCEDURE — 1159F MED LIST DOCD IN RCRD: CPT

## 2024-08-28 PROCEDURE — 99024 POSTOP FOLLOW-UP VISIT: CPT

## 2024-08-28 PROCEDURE — 1036F TOBACCO NON-USER: CPT

## 2024-08-28 NOTE — PROGRESS NOTES
PRIMARY CARE PHYSICIAN: Farhat Jones MD    ORTHOPAEDIC POSTOPERATIVE VISIT    ASSESSMENT & PLAN    Impression: 69 y.o. male 6 weeks postop s/p left anatomic total shoulder arthroplasty, biceps tenodesis done 7/16/2024    Plan:   Overall Chacho is doing very well. He has discontinued with the sling. He has minimal to no pain and is progressing well with physical therapy. He has already obtained great ROM at this time. He will continue to work with physical therapy through the postoperative protocol for the above. We discussed his post-operative precautions and he expressed understanding. Patient will follow up with us in 5 weeks as he leaves for [facility] October 2nd.    I reviewed their postoperative timeline and plan with them. They understand the postoperative precautions and the treatment plan going forward.     Follow-Up: Patient will follow-up 5 weeks with x-rays left shoulder    At the end of the visit, all questions were answered in full. The patient is in agreement with the plan and recommendations. They will call the office with any questions/concerns.      Note dictated with Smart Gardener software. Completed without full typed error editing and sent to avoid delay.      SUBJECTIVE  CHIEF COMPLAINT:   Chief Complaint   Patient presents with    Left Shoulder - Post-op        HPI: Chacho Vences is a 69 y.o. patient who is now 6 weeks postop status post left anatomic total shoulder arthroplasty, biceps tenodesis done 7/16/2024. Overall Chacho is doing well. He feels physical therapy is slow and is not pushing him hard enough. He has obtained great range of motion but would like to return to golf as soon as possible. He states he was told he would not be able to return to [place] until October. He leaves for [facility] October 2nd and would like to follow-up before he leaves.     REVIEW OF SYSTEMS  Constitutional: See HPI for pain assessment, No significant weight loss, recent  "trauma  Cardiovascular: No chest pain, shortness of breath  Respiratory: No difficulty breathing, cough  Gastrointestinal: No nausea, vomiting, diarrhea, constipation  Musculoskeletal: Noted in HPI, positive for pain, restricted motion, stiffness  Integumentary: No rashes, easy bruising, redness   Neurological: no numbness or tingling in extremities, no gait disturbances   Psychiatric: No mood changes, memory changes, social issues  Heme/Lymph: no excessive swelling, easy bruising, excessive bleeding  ENT: No hearing changes  Eyes: No vision changes    CURRENT MEDICATIONS:   Current Outpatient Medications   Medication Sig Dispense Refill    amLODIPine (Norvasc) 10 mg tablet Take 1 tablet (10 mg) by mouth once daily. 90 tablet 1    cyanocobalamin (Vitamin B-12) 50 mcg tablet Take 1 tablet (50 mcg) by mouth once daily.      losartan-hydrochlorothiazide (Hyzaar) 100-25 mg tablet Take 1 tablet by mouth once daily. (Patient taking differently: Take 0.5 tablets by mouth once daily.) 90 tablet 2    saccharomyces boulardii (Florastor) 250 mg capsule Take 1 capsule (250 mg) by mouth once daily.      tadalafil (Cialis) 20 mg tablet Take 1 tablet (20 mg) by mouth once daily as needed for erectile dysfunction. 30 tablet 1    tamsulosin (Flomax) 0.4 mg 24 hr capsule Take 1 capsule (0.4 mg) by mouth 2 times a day. 180 capsule 2     No current facility-administered medications for this visit.        OBJECTIVE    PHYSICAL EXAM      7/16/2024    10:20 AM 7/16/2024    10:35 AM 7/16/2024    10:50 AM 7/16/2024    11:00 AM 7/16/2024    11:15 AM 7/16/2024    11:34 AM 7/29/2024     9:52 AM   Vitals   Systolic 119 112 127 115 119 124    Diastolic 60 60 68 67 70 67    Heart Rate 73 74 78 75 74 71    Temp      36.6 °C (97.9 °F)    Resp 14 15 20 19 19 18    Height (in)       1.727 m (5' 8\")   Weight (lb)       205   BMI       31.17 kg/m2   BSA (m2)       2.11 m2   Visit Report       Report      There is no height or weight on file to " calculate BMI.    General: Well-appearing, no acute distress    Skin intact bilateral upper and lower extremities  No erythema  No warmth    Detailed examination of the left shoulder demonstrates:  Surgical incision(s) well healed  No erythema or warmth  No drainage  No ecchymosis  Resolving swelling, minimal  Biceps contour normal  Shoulder range of motion: Forward flexion 130  Upper extremity motor grossly intact  C5-T1 sensation intact bilaterally  2+ radial pulses bilaterally  Warm and well-perfused, brisk capillary refill    IMAGING: X-ray views of the left shoulder reviewed by me demonstrate anatomic total shoulder arthroplasty in place without evidence of implant loosening or hardware complication, no dislocation.

## 2024-08-29 ENCOUNTER — TREATMENT (OUTPATIENT)
Dept: PHYSICAL THERAPY | Facility: CLINIC | Age: 69
End: 2024-08-29
Payer: MEDICARE

## 2024-08-29 ENCOUNTER — APPOINTMENT (OUTPATIENT)
Dept: PHYSICAL THERAPY | Facility: CLINIC | Age: 69
End: 2024-08-29
Payer: MEDICARE

## 2024-08-29 DIAGNOSIS — Z96.612 STATUS POST TOTAL SHOULDER ARTHROPLASTY, LEFT: Primary | ICD-10-CM

## 2024-08-29 DIAGNOSIS — M25.612 SHOULDER STIFFNESS, LEFT: ICD-10-CM

## 2024-08-29 DIAGNOSIS — M25.512 ACUTE PAIN OF LEFT SHOULDER: ICD-10-CM

## 2024-08-29 PROCEDURE — 97110 THERAPEUTIC EXERCISES: CPT | Mod: GP

## 2024-08-29 NOTE — PROGRESS NOTES
Physical Therapy Treatment  Patient Name: Chacho Vences  MRN: [id number]  Today's Date: 8/29/2024   Visit 8/MNTime Calculation  Start Time: 1200  Stop Time: 1245  Time Calculation (min): 45 min    PRECAUTIONS:  Patient is s/p L anatomic TSA with biceps tenodesis 7/16/24    SUBJECTIVE:  Had follow up with surgeon yesterday who was pleased with progress.  Xrays show normal healing.    OBJECTIVE:  Shoulder PROM (L):  Flexion= 150    TREATMENT:  - Therex:  UBE 2'/2'  Overhead pulley flexion x 2 min   IR-pulley x 1 min  Active scaption x 15 reps in front of mirror to avoid shoulder hiking  Active shoulder ER x 15 reps  Wall slides x 15 reps  GTB mid rows, x 20 reps  Active abduction, then wand abduction x 5-10 reps  Mat Ex:  Supine with L UE vertical:  -serratus punches x 20 reps  -CW/CCW x 20 reps  Sidelying ER and abduction x 15 reps each    Current HEP: scapular retractions, stick revolution, isometric flexion, IR, and ER, wand ER.- DCd these  Changed to the following: wall shoulder flexion, scaption, s/l ER and abduction  MANUAL:  L shld PROM    Education:  Patient took light, slow swing to simulate golf swing without pain.  Discussed possible clearance for golf by surgeon in October    Assessment:   Patient progressing well.  Minimal pain.  ROM progressing as expected.     PLAN:   Continue with focus on improving AROM and gradually add strengthening as appropriate.    Billing:     PT Therapeutic Procedures Time Entry  Therapeutic Exercise Time Entry: 45

## 2024-09-03 ENCOUNTER — TREATMENT (OUTPATIENT)
Dept: PHYSICAL THERAPY | Facility: CLINIC | Age: 69
End: 2024-09-03
Payer: MEDICARE

## 2024-09-03 DIAGNOSIS — Z96.612 STATUS POST TOTAL SHOULDER ARTHROPLASTY, LEFT: Primary | ICD-10-CM

## 2024-09-03 DIAGNOSIS — M25.612 SHOULDER STIFFNESS, LEFT: ICD-10-CM

## 2024-09-03 DIAGNOSIS — M25.512 ACUTE PAIN OF LEFT SHOULDER: ICD-10-CM

## 2024-09-03 PROCEDURE — 97110 THERAPEUTIC EXERCISES: CPT | Mod: GP

## 2024-09-03 NOTE — PROGRESS NOTES
Physical Therapy Treatment  Patient Name: Chacho Vences  MRN: [id number]  Today's Date: 9/3/2024   Visit 9/MNTime Calculation  Start Time: 1115  Stop Time: 1200  Time Calculation (min): 45 min    PRECAUTIONS:  Patient is s/p L anatomic TSA with biceps tenodesis 7/16/24    SUBJECTIVE:  Patient reports has been doing well except has had a little more pain since he hugged his grandson hard (IR).  Also notices opening patio door (doing IR) is painful and avoids doing this with L UE.    OBJECTIVE:  Shoulder PROM (L):  Flexion= 152    TREATMENT:  - Therex:  UBE 2'/2'  Overhead pulley flexion x 2 min   IR-pulley x 1 min  Active scaption x 15 reps in front of mirror to avoid shoulder hiking  Active shoulder ER x 15 reps  Wall slides x 15 reps  GTB mid rows, x 20 reps  Mat Ex:  Supine with L UE vertical:  -1# serratus punches x 20 reps  -1# CW/CCW x 20 reps  Sidelying ER and abduction 2 x 15 reps each    Current HEP: scapular retractions, stick revolution, isometric flexion, IR, and ER, wand ER.- DCd these  Changed to the following: wall shoulder flexion, scaption, s/l ER and abduction  MANUAL:  L shld PROM    Education:  Continued HEP  Assessment:   Slightly increased soreness today, likely due to recent flare up with resisted IR.  PLAN:   Continue with focus on improving AROM and gradually add strengthening as appropriate.    Billing:     PT Therapeutic Procedures Time Entry  Therapeutic Exercise Time Entry: 45

## 2024-09-05 ENCOUNTER — TREATMENT (OUTPATIENT)
Dept: PHYSICAL THERAPY | Facility: CLINIC | Age: 69
End: 2024-09-05
Payer: MEDICARE

## 2024-09-05 DIAGNOSIS — Z96.612 STATUS POST TOTAL SHOULDER ARTHROPLASTY, LEFT: Primary | ICD-10-CM

## 2024-09-05 DIAGNOSIS — M25.512 ACUTE PAIN OF LEFT SHOULDER: ICD-10-CM

## 2024-09-05 DIAGNOSIS — M25.612 SHOULDER STIFFNESS, LEFT: ICD-10-CM

## 2024-09-05 PROCEDURE — 97110 THERAPEUTIC EXERCISES: CPT | Mod: GP

## 2024-09-05 NOTE — PROGRESS NOTES
Physical Therapy Treatment  Patient Name: Chacho Vences  MRN: [id number]  Today's Date: 9/5/2024   Visit 10/MNTime Calculation  Start Time: 0945  Stop Time: 1025  Time Calculation (min): 40 min    PRECAUTIONS:  Patient is s/p L anatomic TSA with biceps tenodesis 7/16/24    SUBJECTIVE:  HEP= yes  No issues since last visit.  Prior soreness from IR has resolved.    OBJECTIVE:  Shoulder AROM (L):  Flexion= 131    TREATMENT:  - Therex:  UBE 2'/2'  Overhead pulley flexion x 2 min   IR-pulley x 1 min  Active scaption x 15 reps  Active shoulder ER x 15 reps  Wall slides x 15 reps  GTB mid and low rows, x 15 reps each  GTB resisted sidestepping L and R x 10 reps each  Mat Ex:  Supine with L UE vertical:  -1# serratus punches x 20 reps  -1# CW/CCW x 20 reps  Sidelying ER and abduction 2 x 15 reps each    Current HEP: scapular retractions, stick revolution, isometric flexion, IR, and ER, wand ER.- DCd these  Changed to the following: wall shoulder flexion, scaption, s/l ER and abduction  MANUAL:  L shld PROM    Education:  Continued HEP  Assessment:   Patient doing very well with gradually progressing strength  PLAN:   Continue with focus on improving AROM and gradually add strengthening as appropriate.  Add wt to sidelying exercises next visit  May try to finish PT by end of September.    Billing:     PT Therapeutic Procedures Time Entry  Therapeutic Exercise Time Entry: 40

## 2024-09-06 ENCOUNTER — TELEPHONE (OUTPATIENT)
Dept: ORTHOPEDIC SURGERY | Facility: HOSPITAL | Age: 69
End: 2024-09-06
Payer: MEDICARE

## 2024-09-06 DIAGNOSIS — Z96.612 STATUS POST TOTAL SHOULDER ARTHROPLASTY, LEFT: Primary | ICD-10-CM

## 2024-09-06 RX ORDER — AMOXICILLIN 500 MG/1
2000 CAPSULE ORAL ONCE
Qty: 4 CAPSULE | Refills: 0 | Status: SHIPPED | OUTPATIENT
Start: 2024-09-06 | End: 2024-09-06

## 2024-09-06 NOTE — TELEPHONE ENCOUNTER
Chacho Vences called in stating he has a dental appointment on Tuesday. He will need antibiotics sent in.     Please send to [facility] in [place]     left anatomic total shoulder arthroplasty, biceps tenodesis done 7/16/2024

## 2024-09-10 ENCOUNTER — TREATMENT (OUTPATIENT)
Dept: PHYSICAL THERAPY | Facility: CLINIC | Age: 69
End: 2024-09-10
Payer: MEDICARE

## 2024-09-10 DIAGNOSIS — Z96.612 STATUS POST TOTAL SHOULDER ARTHROPLASTY, LEFT: ICD-10-CM

## 2024-09-10 DIAGNOSIS — M25.612 SHOULDER STIFFNESS, LEFT: ICD-10-CM

## 2024-09-10 DIAGNOSIS — M25.512 ACUTE PAIN OF LEFT SHOULDER: ICD-10-CM

## 2024-09-10 PROCEDURE — 97110 THERAPEUTIC EXERCISES: CPT | Mod: GP,CQ | Performed by: PHYSICAL THERAPY ASSISTANT

## 2024-09-10 PROCEDURE — 97140 MANUAL THERAPY 1/> REGIONS: CPT | Mod: GP,CQ | Performed by: PHYSICAL THERAPY ASSISTANT

## 2024-09-10 NOTE — PROGRESS NOTES
Physical Therapy Treatment  Patient Name: Chacho Vences  MRN: [id number]  Today's Date: 9/10/2024   Visit 11/MNTime Calculation  Start Time: 0945  Stop Time: 1030  Time Calculation (min): 45 min    PRECAUTIONS:  Patient is s/p L anatomic TSA with biceps tenodesis 7/16/24    SUBJECTIVE:  HEP= yes  Pt reports no pain today. He says things are coming along well but still has some difficulty performing overhead reaching. He says that he is a few degrees off from elevating to height of his L arm.     OBJECTIVE:  Increased PRE's  Shoulder PROM (L):  Flexion= 145  ER WNL  IR 85    TREATMENT:  - Therex:  UBE 3'/3'  Overhead pulley flexion x 2 min   IR-pulley x 1 min  Active scaption x20 reps  Active shoulder ER x20 reps  Wall slides 2x10 reps  GTB mid and low rows, x 15 reps each  GTB resisted sidestepping L and R x 10 reps each  Mat Ex:  Supine with L UE vertical:  -3# ceiling punch into serratus punches 3x10 reps  -1# CW/CCW x 20 reps  Sidelying ER and abduction 3# 3x10    Current HEP: scapular retractions, stick revolution, isometric flexion, IR, and ER, wand ER.- DCd these  Changed to the following: wall shoulder flexion, scaption, s/l ER and abduction  MANUAL:  L shld PROM, x10'    Education:  Continued HEP  Assessment:   Progressive with exercise dosage. Adapting well to resistance stress. Increases in resistance as he felt the weights he was using with previous session was too light.   PLAN:   Continue with focus on improving AROM and gradually add strengthening as appropriate.  Add wt to sidelying exercises next visit  May try to finish PT by end of September.    Billing:     PT Therapeutic Procedures Time Entry  Manual Therapy Time Entry: 10  Therapeutic Exercise Time Entry: 35

## 2024-09-12 ENCOUNTER — TREATMENT (OUTPATIENT)
Dept: PHYSICAL THERAPY | Facility: CLINIC | Age: 69
End: 2024-09-12
Payer: MEDICARE

## 2024-09-12 DIAGNOSIS — M25.512 ACUTE PAIN OF LEFT SHOULDER: ICD-10-CM

## 2024-09-12 DIAGNOSIS — Z96.612 STATUS POST TOTAL SHOULDER ARTHROPLASTY, LEFT: Primary | ICD-10-CM

## 2024-09-12 DIAGNOSIS — M25.612 SHOULDER STIFFNESS, LEFT: ICD-10-CM

## 2024-09-12 PROCEDURE — 97110 THERAPEUTIC EXERCISES: CPT | Mod: GP

## 2024-09-12 NOTE — PROGRESS NOTES
Physical Therapy Treatment  Patient Name: Chacho Vences  MRN: [id number]  Today's Date: 9/12/2024   Visit 12/MNTime Calculation  Start Time: 0945  Stop Time: 1030  Time Calculation (min): 45 min    PRECAUTIONS:  Patient is s/p L anatomic TSA with biceps tenodesis 7/16/24    SUBJECTIVE:  HEP= yes  Pt reports no pain today.   Does notice some hiking of shoulder when doing wall slides.    OBJECTIVE:  Very mild shoulder hike with scaption    TREATMENT:  - Therex:  UBE 3'/3'  Overhead pulley flexion x 2 min   IR-pulley x 1 min  1# Active scaption x20 reps  YTB shoulder ER x20 reps  Wall slides 2x10 reps  GTB mid and low rows, x 15 reps each  GTB resisted sidestepping L and R x 10 reps each  Mat Ex:  Supine with L UE vertical:  -3# serratus punches 3x10 reps  -3# CW/CCW x 20 reps  Sidelying ER and abduction 1# 2x15 reps    Current HEP: scapular retractions, stick revolution, isometric flexion, IR, and ER, wand ER.- DCd these  Changed to the following: wall shoulder flexion, scaption, s/l ER and abduction  MANUAL:  L shld PROM, x10'    Education:  Continued HEP  Assessment:   Patient progressing well with gradual increase in strengthening.  Good attention to form during exercises.    PLAN:   Continue with focus on improving AROM and gradually add strengthening as appropriate.      Billing:     PT Therapeutic Procedures Time Entry  Therapeutic Exercise Time Entry: 45

## 2024-09-16 NOTE — PROGRESS NOTES
Physical Therapy Treatment  Patient Name: Chacho Vences  MRN: [id number]  Today's Date: 9/17/2024   Visit 13/MNTime Calculation  Start Time: 0945  Stop Time: 1030  Time Calculation (min): 45 min    PRECAUTIONS:  Patient is s/p L anatomic TSA with biceps tenodesis 7/16/24    SUBJECTIVE:  HEP= yes  Pt reports no pain today.   Does notice some hiking of shoulder when doing wall slides.    OBJECTIVE:  L shoulder flexion= 135 AROM    TREATMENT:  - Therex:  UBE 3'/3'  Overhead pulley flexion x 2 min   IR-pulley x 1 min  2# Active scaption x20 reps  Wall slides 2x10 reps  Cable Column: mid rows, 2 x 15 reps each  Cable Column: resisted sidestepping L and R x 5 reps for IR and ER  Mat Ex:  Supine with L UE vertical:  -3# serratus punches x 20 reps  -3# CW/CCW x 20 reps  -3# bench press x 20 reps  Sidelying ER and abduction 1# 2x15 reps  Body Blade- 3 positions 2x30 sec each    Current HEP: scapular retractions, stick revolution, isometric flexion, IR, and ER, wand ER.- DCd these  Changed to the following: wall shoulder flexion, scaption, s/l ER and abduction    Education:  Continued HEP  Assessment:   Patient progressing well with gradual increase in strengthening.    Good motivation and effort.    PLAN:   Continue with focus on improving AROM and gradually add strengthening as appropriate.      Billing:     PT Therapeutic Procedures Time Entry  Therapeutic Exercise Time Entry: 45

## 2024-09-17 ENCOUNTER — TREATMENT (OUTPATIENT)
Dept: PHYSICAL THERAPY | Facility: CLINIC | Age: 69
End: 2024-09-17
Payer: MEDICARE

## 2024-09-17 DIAGNOSIS — Z96.612 STATUS POST TOTAL SHOULDER ARTHROPLASTY, LEFT: Primary | ICD-10-CM

## 2024-09-17 DIAGNOSIS — M25.512 ACUTE PAIN OF LEFT SHOULDER: ICD-10-CM

## 2024-09-17 DIAGNOSIS — M25.612 SHOULDER STIFFNESS, LEFT: ICD-10-CM

## 2024-09-17 PROCEDURE — 97110 THERAPEUTIC EXERCISES: CPT | Mod: GP

## 2024-09-19 ENCOUNTER — TREATMENT (OUTPATIENT)
Dept: PHYSICAL THERAPY | Facility: CLINIC | Age: 69
End: 2024-09-19
Payer: MEDICARE

## 2024-09-19 DIAGNOSIS — M25.612 SHOULDER STIFFNESS, LEFT: ICD-10-CM

## 2024-09-19 DIAGNOSIS — Z96.612 STATUS POST TOTAL SHOULDER ARTHROPLASTY, LEFT: Primary | ICD-10-CM

## 2024-09-19 DIAGNOSIS — M25.512 ACUTE PAIN OF LEFT SHOULDER: ICD-10-CM

## 2024-09-19 PROCEDURE — 97110 THERAPEUTIC EXERCISES: CPT | Mod: GP

## 2024-09-19 NOTE — PROGRESS NOTES
Physical Therapy Treatment  Patient Name: Chacho Vences  MRN: [id number]  Today's Date: 9/19/2024   Visit 14/MNTime Calculation  Start Time: 0945  Stop Time: 1030  Time Calculation (min): 45 min    PRECAUTIONS:  Patient is s/p L anatomic TSA with biceps tenodesis 7/16/24    SUBJECTIVE:  HEP= yes  Pt reports no pain today.   Does notice some hiking of shoulder when doing wall slides.    OBJECTIVE:  L shoulder flexion= 140 PROM    TREATMENT:  - Therex:  UBE 2'/2'  Overhead pulley flexion x 2 min   IR-pulley x 1 min  2# Active scaption x20 reps  Cable Column: mid rows, 2 x 15 reps each  Cable Column: resisted sidestepping 5# L and R x 5 reps for IR and ER  Mat Ex:  Supine with L UE vertical:  -3# serratus punches x 20 reps  -3# CW/CCW x 20 reps  -3# bench press x 20 reps  Sidelying ER and abduction 1# 2x15 reps  Body Blade- 3 positions 2x30 sec each  Passive stretching L shoulder    Current HEP: scapular retractions, stick revolution, isometric flexion, IR, and ER, wand ER.- DCd these  Changed to the following: wall shoulder flexion, scaption, s/l ER and abduction    Education:  Continued HEP  Assessment:   Patient progressing well with gradual increase in strengthening.    Good motivation and effort.    PLAN:   Continue with focus on improving AROM and gradually add strengthening as appropriate.      Billing:     PT Therapeutic Procedures Time Entry  Therapeutic Exercise Time Entry: 45

## 2024-09-24 ENCOUNTER — TREATMENT (OUTPATIENT)
Dept: PHYSICAL THERAPY | Facility: CLINIC | Age: 69
End: 2024-09-24
Payer: MEDICARE

## 2024-09-24 DIAGNOSIS — M25.612 SHOULDER STIFFNESS, LEFT: ICD-10-CM

## 2024-09-24 DIAGNOSIS — M25.512 ACUTE PAIN OF LEFT SHOULDER: ICD-10-CM

## 2024-09-24 DIAGNOSIS — Z96.612 STATUS POST TOTAL SHOULDER ARTHROPLASTY, LEFT: ICD-10-CM

## 2024-09-24 PROCEDURE — 97110 THERAPEUTIC EXERCISES: CPT | Mod: GP,CQ | Performed by: PHYSICAL THERAPY ASSISTANT

## 2024-09-24 PROCEDURE — 97140 MANUAL THERAPY 1/> REGIONS: CPT | Mod: GP,CQ | Performed by: PHYSICAL THERAPY ASSISTANT

## 2024-09-24 NOTE — PROGRESS NOTES
Physical Therapy Treatment  Patient Name: Chacho Vences  MRN: [id number]  Today's Date: 9/24/2024   Visit 14/MNTime Calculation  Start Time: 0950  Stop Time: 1035  Time Calculation (min): 45 min    PRECAUTIONS:  Patient is s/p L anatomic TSA with biceps tenodesis 7/16/24    SUBJECTIVE:  HEP= yes  Pt reports no pain today just some tightness in L UT.     OBJECTIVE:  L shoulder   Flexion 145 PROM  ER 70  IR 85    TREATMENT:  - Therex:  UBE 2'/2'  Overhead pulley flexion x 2 min   IR-pulley x 1 min  2# Active scaption 3x10 reps  Shld IR/ER-RTB 2x10ea  Resisted iso walkouts IR/ER RTB x10ea  Cable Column: mid rows, 7.5# 3x10    Mat Ex:  Supine with L UE vertical:  -3# serratus punches x 30 reps  -3# CW/CCW x 20 reps  -3# bench press x 20 reps  Sidelying ER and abduction 1# 2x15 reps  Body Blade- 3 positions 2x30 sec each    Manual:  Passive stretching L shoulder, x10'    Current HEP: scapular retractions, stick revolution, isometric flexion, IR, and ER, wand ER.- DCd these  Changed to the following: wall shoulder flexion, scaption, s/l ER and abduction    Education:  Continued HEP  Assessment:   Progressing very well. Adapting well to exercise stress. Functional ROM progressing well with less compensation.   PLAN:   Continue with focus on improving AROM and gradually add strengthening as appropriate.      Billing:     PT Therapeutic Procedures Time Entry  Manual Therapy Time Entry: 10  Therapeutic Exercise Time Entry: 35

## 2024-09-26 ENCOUNTER — TREATMENT (OUTPATIENT)
Dept: PHYSICAL THERAPY | Facility: CLINIC | Age: 69
End: 2024-09-26
Payer: MEDICARE

## 2024-09-26 DIAGNOSIS — M25.612 SHOULDER STIFFNESS, LEFT: ICD-10-CM

## 2024-09-26 DIAGNOSIS — M25.512 ACUTE PAIN OF LEFT SHOULDER: ICD-10-CM

## 2024-09-26 DIAGNOSIS — Z96.612 STATUS POST TOTAL SHOULDER ARTHROPLASTY, LEFT: Primary | ICD-10-CM

## 2024-09-26 PROCEDURE — 97110 THERAPEUTIC EXERCISES: CPT | Mod: GP

## 2024-09-26 NOTE — PROGRESS NOTES
Physical Therapy Treatment  Patient Name: Chacho Vences  MRN: [id number]  Today's Date: 9/26/2024   Visit 15/MNTime Calculation  Start Time: 0945  Stop Time: 1030  Time Calculation (min): 45 min    PRECAUTIONS:  s/p L anatomic TSA with biceps tenodesis 7/16/24    SUBJECTIVE:  HEP= yes  Notices weakness if would try to  grandchild.  Also feels  is weak.    OBJECTIVE:   strength (R/L)=  68/59    TREATMENT:  - Therex:  UBE x 6 min- fwd/bkwd  Overhead pulley flexion x 2 min   IR-pulley x 1 min  2# Active scaption 1x15 reps  2# scaption with horiz abduction x 15 reps  Resisted iso walkouts IR/ER BTB x10 each  Cable Column: mid rows, 7.5# 2x15 reps  Body Blade- 3 positions 2x30 sec each    Mat Ex:  Supine with L UE vertical:  -3# serratus punches x 30 reps  -3# CW/CCW x 20 reps  -3# bench press x 20 reps  Sidelying ER and abduction 2# 2x15 reps    Current HEP: scapular retractions, stick revolution, isometric flexion, IR, and ER, wand ER.- DCd these  Changed to the following: wall shoulder flexion, scaption, s/l ER and abduction    Education:  Continued HEP  Assessment:   Patient progressing well with progression of strengthening.    PLAN:   Will see patient 1 more visit, then likely hold until returns from out of town.  Will return to area Dec 1st, and will likely return for 1 more visit.  Follow up with surgeon 9/30.      Billing:     PT Therapeutic Procedures Time Entry  Therapeutic Exercise Time Entry: 45

## 2024-09-30 ENCOUNTER — TREATMENT (OUTPATIENT)
Dept: PHYSICAL THERAPY | Facility: CLINIC | Age: 69
End: 2024-09-30
Payer: MEDICARE

## 2024-09-30 ENCOUNTER — HOSPITAL ENCOUNTER (OUTPATIENT)
Dept: RADIOLOGY | Facility: CLINIC | Age: 69
Discharge: HOME | End: 2024-09-30
Payer: MEDICARE

## 2024-09-30 ENCOUNTER — APPOINTMENT (OUTPATIENT)
Dept: ORTHOPEDIC SURGERY | Facility: CLINIC | Age: 69
End: 2024-09-30
Payer: MEDICARE

## 2024-09-30 VITALS — HEIGHT: 68 IN | BODY MASS INDEX: 31.07 KG/M2 | WEIGHT: 205 LBS

## 2024-09-30 DIAGNOSIS — M25.612 SHOULDER STIFFNESS, LEFT: ICD-10-CM

## 2024-09-30 DIAGNOSIS — Z96.612 STATUS POST TOTAL SHOULDER ARTHROPLASTY, LEFT: ICD-10-CM

## 2024-09-30 DIAGNOSIS — Z96.612 STATUS POST TOTAL SHOULDER ARTHROPLASTY, LEFT: Primary | ICD-10-CM

## 2024-09-30 DIAGNOSIS — M25.512 ACUTE PAIN OF LEFT SHOULDER: ICD-10-CM

## 2024-09-30 PROCEDURE — 1160F RVW MEDS BY RX/DR IN RCRD: CPT | Performed by: STUDENT IN AN ORGANIZED HEALTH CARE EDUCATION/TRAINING PROGRAM

## 2024-09-30 PROCEDURE — 1036F TOBACCO NON-USER: CPT | Performed by: STUDENT IN AN ORGANIZED HEALTH CARE EDUCATION/TRAINING PROGRAM

## 2024-09-30 PROCEDURE — 99024 POSTOP FOLLOW-UP VISIT: CPT | Performed by: STUDENT IN AN ORGANIZED HEALTH CARE EDUCATION/TRAINING PROGRAM

## 2024-09-30 PROCEDURE — 73030 X-RAY EXAM OF SHOULDER: CPT | Mod: LEFT SIDE | Performed by: RADIOLOGY

## 2024-09-30 PROCEDURE — 1159F MED LIST DOCD IN RCRD: CPT | Performed by: STUDENT IN AN ORGANIZED HEALTH CARE EDUCATION/TRAINING PROGRAM

## 2024-09-30 PROCEDURE — 3008F BODY MASS INDEX DOCD: CPT | Performed by: STUDENT IN AN ORGANIZED HEALTH CARE EDUCATION/TRAINING PROGRAM

## 2024-09-30 PROCEDURE — 97110 THERAPEUTIC EXERCISES: CPT | Mod: GP

## 2024-09-30 PROCEDURE — 73030 X-RAY EXAM OF SHOULDER: CPT | Mod: LT

## 2024-09-30 ASSESSMENT — PAIN - FUNCTIONAL ASSESSMENT: PAIN_FUNCTIONAL_ASSESSMENT: NO/DENIES PAIN

## 2024-09-30 NOTE — PROGRESS NOTES
Physical Therapy Treatment  Patient Name: Chacho Vences  MRN: [id number]  Today's Date: 9/30/2024   Visit 16/MNTime Calculation  Start Time: 1000  Stop Time: 1038  Time Calculation (min): 38 min    PRECAUTIONS:  s/p L anatomic TSA with biceps tenodesis 7/16/24    SUBJECTIVE:  HEP= yes  Patient had follow up visit with surgeon this morning.  Pleased with progress.  States is ahead of schedule in his recovery.    Cleared to play golf with mild restrictions.    OBJECTIVE:      TREATMENT:  - Therex:  UBE x 4 min- fwd/bkwd  Overhead pulley flexion x 2 min   IR-pulley x 1 min  2# Active scaption 1x15 reps  2# scaption with horiz abduction x 15 reps  Resisted iso walkouts IR/ER BTB x10 each  Cable Column: mid rows, 7.5# 2x15 reps    Mat Ex:  Supine with L UE vertical:  -3# serratus punches x 30 reps  -3# CW/CCW x 20 reps  Sidelying ER and abduction 2# 2x15 reps    Current HEP: wall shoulder flexion, scaption, s/l ER and abduction.  Added rows, resisted IR and ER, towel IR stretch    Education:  Continued HEP  Assessment:   Patient progressing well with progression of strengthening.  Discussed updated HEP and appropriate progression of HEP.    PLAN:   Will hold until returns from out of town.  Will return to area Dec 1st, and will likely return for 1 more visit.      Billing:     PT Therapeutic Procedures Time Entry  Therapeutic Exercise Time Entry: 38

## 2024-09-30 NOTE — PROGRESS NOTES
PRIMARY CARE PHYSICIAN: Farhat Jones MD    ORTHOPAEDIC POSTOPERATIVE VISIT    ASSESSMENT & PLAN    Impression: 69 y.o. male 10 weeks postop s/p left anatomic total shoulder arthroplasty, biceps tenodesis done 7/16/2024.    Plan:   Overall Chacho is doing very well. He will continue to work with physical therapy through the postoperative protocol for the above.  He can progressively return to golf.  We discussed his post-operative precautions and he expressed understanding. Patient will follow up with us in 3 months for his next postoperative visit.    I reviewed their postoperative timeline and plan with them. They understand the postoperative precautions and the treatment plan going forward.     Follow-Up: Patient will follow-up 3 months with x-rays left shoulder    At the end of the visit, all questions were answered in full. The patient is in agreement with the plan and recommendations. They will call the office with any questions/concerns.      Note dictated with [employer] software. Completed without full typed error editing and sent to avoid delay.      SUBJECTIVE  CHIEF COMPLAINT:   Chief Complaint   Patient presents with    Left Shoulder - Follow-up, Post-op        HPI: Chacho Vences is a 69 y.o. patient who is now 10 weeks postop status post left anatomic total shoulder arthroplasty, biceps tenodesis done 7/16/2024. He is currently in physical therapy at [facility]. He reports of soreness from time to time. He is doing well. Has no concerns. XR done today.  He is hoping to return to golf.    8/28/2024 HPI Visit Info:  Overall Chacho is doing well. He feels physical therapy is slow and is not pushing him hard enough. He has obtained great range of motion but would like to return to golf as soon as possible. He states he was told he would not be able to return to [place] until October. He leaves for [facility] October 2nd and would like to follow-up before he leaves.     REVIEW OF  "SYSTEMS  Constitutional: See HPI for pain assessment, No significant weight loss, recent trauma  Cardiovascular: No chest pain, shortness of breath  Respiratory: No difficulty breathing, cough  Gastrointestinal: No nausea, vomiting, diarrhea, constipation  Musculoskeletal: Noted in HPI, positive for pain, restricted motion, stiffness  Integumentary: No rashes, easy bruising, redness   Neurological: no numbness or tingling in extremities, no gait disturbances   Psychiatric: No mood changes, memory changes, social issues  Heme/Lymph: no excessive swelling, easy bruising, excessive bleeding  ENT: No hearing changes  Eyes: No vision changes    CURRENT MEDICATIONS:   Current Outpatient Medications   Medication Sig Dispense Refill    amLODIPine (Norvasc) 10 mg tablet Take 1 tablet (10 mg) by mouth once daily. 90 tablet 1    cyanocobalamin (Vitamin B-12) 50 mcg tablet Take 1 tablet (50 mcg) by mouth once daily.      losartan-hydrochlorothiazide (Hyzaar) 100-25 mg tablet Take 1 tablet by mouth once daily. (Patient taking differently: Take 0.5 tablets by mouth once daily.) 90 tablet 2    saccharomyces boulardii (Florastor) 250 mg capsule Take 1 capsule (250 mg) by mouth once daily.      tadalafil (Cialis) 20 mg tablet Take 1 tablet (20 mg) by mouth once daily as needed for erectile dysfunction. 30 tablet 1    tamsulosin (Flomax) 0.4 mg 24 hr capsule Take 1 capsule (0.4 mg) by mouth 2 times a day. 180 capsule 2     No current facility-administered medications for this visit.        OBJECTIVE    PHYSICAL EXAM      7/16/2024    10:20 AM 7/16/2024    10:35 AM 7/16/2024    10:50 AM 7/16/2024    11:00 AM 7/16/2024    11:15 AM 7/16/2024    11:34 AM 7/29/2024     9:52 AM   Vitals   Systolic 119 112 127 115 119 124    Diastolic 60 60 68 67 70 67    Heart Rate 73 74 78 75 74 71    Temp      36.6 °C (97.9 °F)    Resp 14 15 20 19 19 18    Height (in)       1.727 m (5' 8\")   Weight (lb)       205   BMI       31.17 kg/m2   BSA (m2)       " 2.11 m2   Visit Report       Report      There is no height or weight on file to calculate BMI.    General: Well-appearing, no acute distress    Skin intact bilateral upper and lower extremities  No erythema  No warmth    Detailed examination of the left shoulder demonstrates:  Surgical incision well healed  No erythema or warmth  No ecchymosis or soft tissue swelling  Biceps contour normal  Shoulder range of motion: Forward flexion 150  Upper extremity motor grossly intact  C5-T1 sensation intact bilaterally  2+ radial pulses bilaterally  Warm and well-perfused, brisk capillary refill    IMAGING: X-ray views of the left shoulder reviewed by me demonstrate anatomic total shoulder arthroplasty in place without evidence of implant loosening or hardware complication, no dislocation.

## 2024-11-05 ENCOUNTER — APPOINTMENT (OUTPATIENT)
Dept: PRIMARY CARE | Facility: CLINIC | Age: 69
End: 2024-11-05
Payer: MEDICARE

## 2024-12-11 ENCOUNTER — OFFICE VISIT (OUTPATIENT)
Dept: PRIMARY CARE | Facility: CLINIC | Age: 69
End: 2024-12-11
Payer: MEDICARE

## 2024-12-11 VITALS
DIASTOLIC BLOOD PRESSURE: 72 MMHG | OXYGEN SATURATION: 97 % | SYSTOLIC BLOOD PRESSURE: 124 MMHG | TEMPERATURE: 97.1 F | HEART RATE: 75 BPM | BODY MASS INDEX: 31.17 KG/M2 | HEIGHT: 68 IN

## 2024-12-11 DIAGNOSIS — I10 PRIMARY HYPERTENSION: ICD-10-CM

## 2024-12-11 DIAGNOSIS — E78.2 MIXED HYPERLIPIDEMIA: ICD-10-CM

## 2024-12-11 DIAGNOSIS — R35.0 BENIGN PROSTATIC HYPERPLASIA WITH URINARY FREQUENCY: ICD-10-CM

## 2024-12-11 DIAGNOSIS — N40.1 BENIGN PROSTATIC HYPERPLASIA WITH URINARY FREQUENCY: ICD-10-CM

## 2024-12-11 DIAGNOSIS — N52.9 ERECTILE DYSFUNCTION, UNSPECIFIED ERECTILE DYSFUNCTION TYPE: ICD-10-CM

## 2024-12-11 DIAGNOSIS — Z00.00 MEDICARE ANNUAL WELLNESS VISIT, SUBSEQUENT: Primary | ICD-10-CM

## 2024-12-11 PROCEDURE — 1036F TOBACCO NON-USER: CPT | Performed by: FAMILY MEDICINE

## 2024-12-11 PROCEDURE — G0439 PPPS, SUBSEQ VISIT: HCPCS | Performed by: FAMILY MEDICINE

## 2024-12-11 PROCEDURE — 3078F DIAST BP <80 MM HG: CPT | Performed by: FAMILY MEDICINE

## 2024-12-11 PROCEDURE — 99214 OFFICE O/P EST MOD 30 MIN: CPT | Performed by: FAMILY MEDICINE

## 2024-12-11 PROCEDURE — 1170F FXNL STATUS ASSESSED: CPT | Performed by: FAMILY MEDICINE

## 2024-12-11 PROCEDURE — 1159F MED LIST DOCD IN RCRD: CPT | Performed by: FAMILY MEDICINE

## 2024-12-11 PROCEDURE — 3074F SYST BP LT 130 MM HG: CPT | Performed by: FAMILY MEDICINE

## 2024-12-11 RX ORDER — LOSARTAN POTASSIUM AND HYDROCHLOROTHIAZIDE 25; 100 MG/1; MG/1
0.5 TABLET ORAL 2 TIMES DAILY
Qty: 90 TABLET | Refills: 1 | Status: SHIPPED | OUTPATIENT
Start: 2024-12-11 | End: 2025-06-09

## 2024-12-11 RX ORDER — AMLODIPINE BESYLATE 10 MG/1
10 TABLET ORAL DAILY
Qty: 90 TABLET | Refills: 1 | Status: SHIPPED | OUTPATIENT
Start: 2024-12-11 | End: 2025-06-09

## 2024-12-11 RX ORDER — LOSARTAN POTASSIUM AND HYDROCHLOROTHIAZIDE 25; 100 MG/1; MG/1
0.5 TABLET ORAL 2 TIMES DAILY
Qty: 90 TABLET | Refills: 1 | Status: SHIPPED | OUTPATIENT
Start: 2024-12-11 | End: 2024-12-11 | Stop reason: SDUPTHER

## 2024-12-11 RX ORDER — AMLODIPINE BESYLATE 10 MG/1
10 TABLET ORAL DAILY
Qty: 90 TABLET | Refills: 1 | Status: SHIPPED | OUTPATIENT
Start: 2024-12-11 | End: 2024-12-11 | Stop reason: SDUPTHER

## 2024-12-11 RX ORDER — TAMSULOSIN HYDROCHLORIDE 0.4 MG/1
0.4 CAPSULE ORAL 2 TIMES DAILY
Qty: 180 CAPSULE | Refills: 2 | Status: SHIPPED | OUTPATIENT
Start: 2024-12-11 | End: 2024-12-11 | Stop reason: SDUPTHER

## 2024-12-11 RX ORDER — TADALAFIL 20 MG/1
20 TABLET ORAL DAILY PRN
Qty: 30 TABLET | Refills: 1 | Status: SHIPPED | OUTPATIENT
Start: 2024-12-11 | End: 2024-12-11 | Stop reason: SDUPTHER

## 2024-12-11 RX ORDER — TADALAFIL 20 MG/1
20 TABLET ORAL DAILY PRN
Qty: 30 TABLET | Refills: 1 | Status: SHIPPED | OUTPATIENT
Start: 2024-12-11

## 2024-12-11 RX ORDER — TAMSULOSIN HYDROCHLORIDE 0.4 MG/1
0.4 CAPSULE ORAL 2 TIMES DAILY
Qty: 180 CAPSULE | Refills: 2 | Status: SHIPPED | OUTPATIENT
Start: 2024-12-11 | End: 2025-09-07

## 2024-12-11 ASSESSMENT — PATIENT HEALTH QUESTIONNAIRE - PHQ9
1. LITTLE INTEREST OR PLEASURE IN DOING THINGS: NOT AT ALL
SUM OF ALL RESPONSES TO PHQ9 QUESTIONS 1 AND 2: 0
2. FEELING DOWN, DEPRESSED OR HOPELESS: NOT AT ALL

## 2024-12-11 ASSESSMENT — ACTIVITIES OF DAILY LIVING (ADL)
BATHING: INDEPENDENT
DOING_HOUSEWORK: INDEPENDENT
MANAGING_FINANCES: INDEPENDENT
TAKING_MEDICATION: INDEPENDENT
GROCERY_SHOPPING: INDEPENDENT
DRESSING: INDEPENDENT

## 2024-12-11 NOTE — PROGRESS NOTES
"Subjective   Reason for Visit: Chacho Vences is an 69 y.o. male here for a Medicare Wellness visit.     Past Medical, Surgical, and Family History reviewed and updated in chart.    Reviewed all medications by prescribing practitioner or clinical pharmacist (such as prescriptions, OTCs, herbal therapies and supplements) and documented in the medical record.    HPI  HTN: well controlled  ED: needs refills of Cialis  Prostate CA: doing well after radiation.  PSA dropped significantly  BPH: doing well on flomax    Preparing meals - independent  Using telephone - independent  Bladder - continent  Bowel - continent    Recent hospital stays - none    ADLs - able to dress, walk and bath independently  Instrumental ADL's - able to shop, maintain finances, managing medications, housekeeping independently    Depression: PHQ-2 (screening 2 mins) - negative    Opioid use -   none  Exercise -  stays active  Diet - well balanced  Pain score - minimal    Providers    ANTWON dementia screen - given to pt    Education - educated pt on healthy eating, exercise, weight loss    Falls - no falls      Counseled pt on living will - has living will    AAA screening:  NA     Prostate CA screen:  treated for prostate CA    CV screening: CA score in 2018    Colonoscopy:  done cologuard in 23, due for 2026    Diabetes screening:  neg    Glaucoma screen:  recommended optho follow up    CT chest tob screen: NA    Vaccines:  got flu, doesn't plan to get COVID booster.  Had pneumonia shot.      Patient Care Team:  Farhat Jones MD as PCP - General (Family Medicine)  Farhat Jones MD as PCP - [facility] Attributed Provider     Review of Systems   All other systems reviewed and are negative.      Objective   Vitals:  /72   Pulse 75   Temp 36.2 °C (97.1 °F)   Ht 1.727 m (5' 8\")   SpO2 97%   BMI 31.17 kg/m²       Physical Exam  Vitals reviewed.   Constitutional:       General: He is not in acute distress.     Appearance: Normal " appearance. He is well-developed. He is not diaphoretic.   HENT:      Head: Normocephalic and atraumatic.      Right Ear: Tympanic membrane normal.      Left Ear: Tympanic membrane normal.      Nose: Nose normal.      Mouth/Throat:      Mouth: Mucous membranes are moist.   Eyes:      Pupils: Pupils are equal, round, and reactive to light.   Cardiovascular:      Rate and Rhythm: Normal rate and regular rhythm.      Heart sounds: Normal heart sounds. No murmur heard.     No friction rub. No gallop.   Pulmonary:      Effort: Pulmonary effort is normal.      Breath sounds: Normal breath sounds. No rales.   Abdominal:      General: Bowel sounds are normal.      Palpations: Abdomen is soft.      Tenderness: There is no abdominal tenderness.   Musculoskeletal:      Cervical back: Normal range of motion and neck supple.   Skin:     General: Skin is warm and dry.   Neurological:      Mental Status: He is alert.   Psychiatric:         Mood and Affect: Mood normal.         Assessment/Plan   Problem List Items Addressed This Visit       Hyperlipidemia    Overview     Comment on above: Added by Problem List Migration; 2013-12-3;         BPH (benign prostatic hyperplasia)    Relevant Medications    tamsulosin (Flomax) 0.4 mg 24 hr capsule    Hypertension    Relevant Medications    losartan-hydrochlorothiazide (Hyzaar) 100-25 mg tablet    amLODIPine (Norvasc) 10 mg tablet    Other Relevant Orders    Lipid Panel    Comprehensive Metabolic Panel    CBC and Auto Differential     Other Visit Diagnoses       Medicare annual wellness visit, subsequent    -  Primary    Erectile dysfunction, unspecified erectile dysfunction type        Relevant Medications    tadalafil (Cialis) 20 mg tablet          Patient is doing well.  Refilled pts meds.  Follow up in 6 mo.

## 2024-12-13 ENCOUNTER — LAB (OUTPATIENT)
Dept: LAB | Facility: LAB | Age: 69
End: 2024-12-13
Payer: MEDICARE

## 2024-12-13 DIAGNOSIS — I10 PRIMARY HYPERTENSION: ICD-10-CM

## 2024-12-13 LAB
ALBUMIN SERPL BCP-MCNC: 4.2 G/DL (ref 3.4–5)
ALP SERPL-CCNC: 81 U/L (ref 33–136)
ALT SERPL W P-5'-P-CCNC: 15 U/L (ref 10–52)
ANION GAP SERPL CALC-SCNC: 12 MMOL/L (ref 10–20)
AST SERPL W P-5'-P-CCNC: 15 U/L (ref 9–39)
BASOPHILS # BLD AUTO: 0.06 X10*3/UL (ref 0–0.1)
BASOPHILS NFR BLD AUTO: 1.1 %
BILIRUB SERPL-MCNC: 0.6 MG/DL (ref 0–1.2)
BUN SERPL-MCNC: 17 MG/DL (ref 6–23)
CALCIUM SERPL-MCNC: 9.2 MG/DL (ref 8.6–10.6)
CHLORIDE SERPL-SCNC: 101 MMOL/L (ref 98–107)
CHOLEST SERPL-MCNC: 169 MG/DL (ref 0–199)
CHOLESTEROL/HDL RATIO: 5.9
CO2 SERPL-SCNC: 29 MMOL/L (ref 21–32)
CREAT SERPL-MCNC: 1.13 MG/DL (ref 0.5–1.3)
EGFRCR SERPLBLD CKD-EPI 2021: 70 ML/MIN/1.73M*2
EOSINOPHIL # BLD AUTO: 0.06 X10*3/UL (ref 0–0.7)
EOSINOPHIL NFR BLD AUTO: 1.1 %
ERYTHROCYTE [DISTWIDTH] IN BLOOD BY AUTOMATED COUNT: 13 % (ref 11.5–14.5)
GLUCOSE SERPL-MCNC: 101 MG/DL (ref 74–99)
HCT VFR BLD AUTO: 43.4 % (ref 41–52)
HDLC SERPL-MCNC: 28.5 MG/DL
HGB BLD-MCNC: 14.9 G/DL (ref 13.5–17.5)
IMM GRANULOCYTES # BLD AUTO: 0.03 X10*3/UL (ref 0–0.7)
IMM GRANULOCYTES NFR BLD AUTO: 0.5 % (ref 0–0.9)
LDLC SERPL CALC-MCNC: 109 MG/DL
LYMPHOCYTES # BLD AUTO: 1.51 X10*3/UL (ref 1.2–4.8)
LYMPHOCYTES NFR BLD AUTO: 27 %
MCH RBC QN AUTO: 33 PG (ref 26–34)
MCHC RBC AUTO-ENTMCNC: 34.3 G/DL (ref 32–36)
MCV RBC AUTO: 96 FL (ref 80–100)
MONOCYTES # BLD AUTO: 0.51 X10*3/UL (ref 0.1–1)
MONOCYTES NFR BLD AUTO: 9.1 %
NEUTROPHILS # BLD AUTO: 3.43 X10*3/UL (ref 1.2–7.7)
NEUTROPHILS NFR BLD AUTO: 61.2 %
NON HDL CHOLESTEROL: 141 MG/DL (ref 0–149)
NRBC BLD-RTO: 0 /100 WBCS (ref 0–0)
PLATELET # BLD AUTO: 171 X10*3/UL (ref 150–450)
POTASSIUM SERPL-SCNC: 4 MMOL/L (ref 3.5–5.3)
PROT SERPL-MCNC: 6.8 G/DL (ref 6.4–8.2)
RBC # BLD AUTO: 4.51 X10*6/UL (ref 4.5–5.9)
SODIUM SERPL-SCNC: 138 MMOL/L (ref 136–145)
TRIGL SERPL-MCNC: 160 MG/DL (ref 0–149)
VLDL: 32 MG/DL (ref 0–40)
WBC # BLD AUTO: 5.6 X10*3/UL (ref 4.4–11.3)

## 2024-12-13 PROCEDURE — 80053 COMPREHEN METABOLIC PANEL: CPT

## 2024-12-13 PROCEDURE — 80061 LIPID PANEL: CPT

## 2024-12-13 PROCEDURE — 85025 COMPLETE CBC W/AUTO DIFF WBC: CPT

## 2024-12-13 PROCEDURE — 36415 COLL VENOUS BLD VENIPUNCTURE: CPT

## 2024-12-14 NOTE — RESULT ENCOUNTER NOTE
Patient's cholesterol is a bit worse than last time.  Anemia has resolved.  Hopefully he can watch his diet and get his cholesterol back down again.

## 2024-12-16 ENCOUNTER — APPOINTMENT (OUTPATIENT)
Dept: ORTHOPEDIC SURGERY | Facility: CLINIC | Age: 69
End: 2024-12-16
Payer: MEDICARE

## 2024-12-16 ENCOUNTER — HOSPITAL ENCOUNTER (OUTPATIENT)
Dept: RADIOLOGY | Facility: CLINIC | Age: 69
Discharge: HOME | End: 2024-12-16
Payer: MEDICARE

## 2024-12-16 DIAGNOSIS — Z96.612 STATUS POST TOTAL SHOULDER ARTHROPLASTY, LEFT: ICD-10-CM

## 2024-12-16 PROCEDURE — 1036F TOBACCO NON-USER: CPT | Performed by: STUDENT IN AN ORGANIZED HEALTH CARE EDUCATION/TRAINING PROGRAM

## 2024-12-16 PROCEDURE — 1160F RVW MEDS BY RX/DR IN RCRD: CPT | Performed by: STUDENT IN AN ORGANIZED HEALTH CARE EDUCATION/TRAINING PROGRAM

## 2024-12-16 PROCEDURE — 1159F MED LIST DOCD IN RCRD: CPT | Performed by: STUDENT IN AN ORGANIZED HEALTH CARE EDUCATION/TRAINING PROGRAM

## 2024-12-16 PROCEDURE — 99213 OFFICE O/P EST LOW 20 MIN: CPT | Performed by: STUDENT IN AN ORGANIZED HEALTH CARE EDUCATION/TRAINING PROGRAM

## 2024-12-16 PROCEDURE — 73030 X-RAY EXAM OF SHOULDER: CPT | Mod: LT

## 2024-12-16 PROCEDURE — 73030 X-RAY EXAM OF SHOULDER: CPT | Mod: LEFT SIDE | Performed by: RADIOLOGY

## 2024-12-16 NOTE — PROGRESS NOTES
PRIMARY CARE PHYSICIAN: Farhat Jones MD    ORTHOPAEDIC POSTOPERATIVE VISIT    ASSESSMENT & PLAN    Impression: 69 y.o. male 5 months postop s/p left anatomic total shoulder arthroplasty, biceps tenodesis done 7/16/2024.      Plan:   Overall Chacho is doing very well. He will continue to work with physical therapy through the postoperative protocol for the above.  He can progressively return to golf.  We discussed his post-operative precautions and he expressed understanding. Patient will follow up at the 1 year rajan for his annual postoperative visit.    I reviewed their postoperative timeline and plan with them. They understand the postoperative precautions and the treatment plan going forward.     Follow-Up: Patient will follow-up for his annual postoperative visit with x-rays left shoulder    At the end of the visit, all questions were answered in full. The patient is in agreement with the plan and recommendations. They will call the office with any questions/concerns.      Note dictated with [employer] software. Completed without full typed error editing and sent to avoid delay.      SUBJECTIVE  CHIEF COMPLAINT:   Chief Complaint   Patient presents with    Left Shoulder - Post-op        HPI: Chacho Vences is a 69 y.o. patient who is now 5 months postop status post left anatomic total shoulder arthroplasty, biceps tenodesis done 7/16/2024. X-rays updated today. Overall he is doing well. He does mention soreness in his shoulder. He reports that he is no longer attending physical therapy.       REVIEW OF SYSTEMS  Constitutional: See HPI for pain assessment, No significant weight loss, recent trauma  Cardiovascular: No chest pain, shortness of breath  Respiratory: No difficulty breathing, cough  Gastrointestinal: No nausea, vomiting, diarrhea, constipation  Musculoskeletal: Noted in HPI, positive for pain, restricted motion, stiffness  Integumentary: No rashes, easy bruising, redness  "  Neurological: no numbness or tingling in extremities, no gait disturbances   Psychiatric: No mood changes, memory changes, social issues  Heme/Lymph: no excessive swelling, easy bruising, excessive bleeding  ENT: No hearing changes  Eyes: No vision changes    CURRENT MEDICATIONS:   Current Outpatient Medications   Medication Sig Dispense Refill    amLODIPine (Norvasc) 10 mg tablet Take 1 tablet (10 mg) by mouth once daily. 90 tablet 1    cyanocobalamin (Vitamin B-12) 50 mcg tablet Take 1 tablet (50 mcg) by mouth once daily.      losartan-hydrochlorothiazide (Hyzaar) 100-25 mg tablet Take 0.5 tablets by mouth 2 times a day. 90 tablet 1    saccharomyces boulardii (Florastor) 250 mg capsule Take 1 capsule (250 mg) by mouth once daily.      tadalafil (Cialis) 20 mg tablet Take 1 tablet (20 mg) by mouth once daily as needed for erectile dysfunction. 30 tablet 1    tamsulosin (Flomax) 0.4 mg 24 hr capsule Take 1 capsule (0.4 mg) by mouth 2 times a day. 180 capsule 2     No current facility-administered medications for this visit.        OBJECTIVE    PHYSICAL EXAM      7/16/2024    10:50 AM 7/16/2024    11:00 AM 7/16/2024    11:15 AM 7/16/2024    11:34 AM 7/29/2024     9:52 AM 9/30/2024     8:19 AM 12/11/2024    10:44 AM   Vitals   Systolic 127 115 119 124   124   Diastolic 68 67 70 67   72   Heart Rate 78 75 74 71   75   Temp    36.6 °C (97.9 °F)   36.2 °C (97.1 °F)   Resp 20 19 19 18      Height     1.727 m (5' 8\") 1.727 m (5' 8\") 1.727 m (5' 8\")   Weight (lb)     205 205    BMI     31.17 kg/m2 31.17 kg/m2 31.17 kg/m2   BSA (m2)     2.11 m2 2.11 m2 2.11 m2   Visit Report     Report Report Report      There is no height or weight on file to calculate BMI.    General: Well-appearing, no acute distress    Skin intact bilateral upper and lower extremities  No erythema  No warmth    Detailed examination of the left shoulder demonstrates:  Surgical incision well healed  No erythema or warmth  No ecchymosis or soft tissue " swelling  Biceps contour normal  Shoulder range of motion: Forward flexion 150  Upper extremity motor grossly intact  C5-T1 sensation intact bilaterally  2+ radial pulses bilaterally  Warm and well-perfused, brisk capillary refill    IMAGING: X-ray views of the left shoulder reviewed by me demonstrate anatomic total shoulder arthroplasty in place without evidence of implant loosening or hardware complication, no dislocation.

## 2025-01-09 ENCOUNTER — LAB (OUTPATIENT)
Dept: LAB | Facility: LAB | Age: 70
End: 2025-01-09
Payer: MEDICARE

## 2025-01-09 DIAGNOSIS — C61 PROSTATE CANCER (MULTI): ICD-10-CM

## 2025-01-09 LAB — PSA SERPL-MCNC: 0.1 NG/ML

## 2025-01-09 PROCEDURE — 84153 ASSAY OF PSA TOTAL: CPT

## 2025-01-10 ENCOUNTER — HOSPITAL ENCOUNTER (OUTPATIENT)
Dept: RADIATION ONCOLOGY | Facility: CLINIC | Age: 70
Setting detail: RADIATION/ONCOLOGY SERIES
Discharge: HOME | End: 2025-01-10
Payer: MEDICARE

## 2025-01-10 VITALS
RESPIRATION RATE: 16 BRPM | HEART RATE: 76 BPM | BODY MASS INDEX: 32.1 KG/M2 | SYSTOLIC BLOOD PRESSURE: 130 MMHG | DIASTOLIC BLOOD PRESSURE: 71 MMHG | TEMPERATURE: 97 F | OXYGEN SATURATION: 96 % | WEIGHT: 211.1 LBS

## 2025-01-10 DIAGNOSIS — C61 PROSTATE CANCER (MULTI): Primary | ICD-10-CM

## 2025-01-10 PROCEDURE — 99214 OFFICE O/P EST MOD 30 MIN: CPT | Performed by: STUDENT IN AN ORGANIZED HEALTH CARE EDUCATION/TRAINING PROGRAM

## 2025-01-10 ASSESSMENT — ENCOUNTER SYMPTOMS
DEPRESSION: 0
LOSS OF SENSATION IN FEET: 0
OCCASIONAL FEELINGS OF UNSTEADINESS: 0

## 2025-01-10 ASSESSMENT — COLUMBIA-SUICIDE SEVERITY RATING SCALE - C-SSRS
1. IN THE PAST MONTH, HAVE YOU WISHED YOU WERE DEAD OR WISHED YOU COULD GO TO SLEEP AND NOT WAKE UP?: NO
2. HAVE YOU ACTUALLY HAD ANY THOUGHTS OF KILLING YOURSELF?: NO
6. HAVE YOU EVER DONE ANYTHING, STARTED TO DO ANYTHING, OR PREPARED TO DO ANYTHING TO END YOUR LIFE?: NO

## 2025-01-10 ASSESSMENT — PATIENT HEALTH QUESTIONNAIRE - PHQ9
2. FEELING DOWN, DEPRESSED OR HOPELESS: NOT AT ALL
1. LITTLE INTEREST OR PLEASURE IN DOING THINGS: NOT AT ALL
SUM OF ALL RESPONSES TO PHQ9 QUESTIONS 1 & 2: 0

## 2025-01-10 ASSESSMENT — PAIN SCALES - GENERAL: PAINLEVEL_OUTOF10: 0-NO PAIN

## 2025-01-10 NOTE — PROGRESS NOTES
Radiation Oncology Follow-Up    Patient Name:  Chacho Vences  MRN:  [id number]  :  1955    Referring Provider: No ref. provider found  Primary Care Provider: Farhat Jones MD  Care Team: Patient Care Team:  Farhat Jones MD as PCP - General (Family Medicine)  Farhat Jones MD as PCP - [facility] Attributed Provider    Date of Service: 1/10/2025    SUBJECTIVE  History of Present Illness:  Chacho Vences is a 69 y.o. 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 He received definitive prostate SBRT 37.5 Gy/5 fractions completed 23. He had SpaceOAR hydrogel and fiducials placed prior to treatment. He declined ADT.    Lab Results   Component Value Date    PSA 0.10 2025    PSA <0.10 2024    PSA 0.38 2023      Since his last visit he had shoulder replacement in 2024 and he has been more sedentary because of restrictions on his activity. He has noticed some occasional blood tinge in his semen starting in 10/2024, but he has no pain with ejaculation and no blood in his urine or stool. He denies fevers/chills or increased urinary urgency/frequency. He is taking Flomax 0.4 mg twice a day now, with stable nocturia once per night, and some hesitancy during the day. He denies dysuria, hematuria, loose stools, blood in his stool, or unexplained weight loss.     Treatment Rendered:   Prostate SBRT 37.5 Gy/5 fractions completed 23       Review of Systems:   Review of Systems   All other systems reviewed and are negative.      Performance Status:   The Karnofsky performance scale today is 100, Fully active, able to carry on all pre-disease performed without restriction (ECOG equivalent 0).       OBJECTIVE  Vital Signs:  /71   Pulse 76   Temp 36.1 °C (97 °F) (Temporal)   Resp 16   Wt  95.8 kg (211 lb 1.6 oz)   SpO2 96%   BMI 32.10 kg/m²   Physical Exam  Constitutional:       General: He is not in acute distress.     Appearance: Normal appearance. He is not toxic-appearing.   HENT:      Head: Normocephalic and atraumatic.      Mouth/Throat:      Mouth: Mucous membranes are moist.      Pharynx: No oropharyngeal exudate or posterior oropharyngeal erythema.   Eyes:      General: No scleral icterus.     Extraocular Movements: Extraocular movements intact.      Conjunctiva/sclera: Conjunctivae normal.      Pupils: Pupils are equal, round, and reactive to light.   Pulmonary:      Effort: Pulmonary effort is normal. No respiratory distress.   Musculoskeletal:         General: Normal range of motion.      Cervical back: Normal range of motion and neck supple.      Right lower leg: No edema.      Left lower leg: No edema.   Skin:     General: Skin is warm and dry.      Findings: No lesion or rash.   Neurological:      General: No focal deficit present.      Mental Status: He is alert and oriented to person, place, and time.      Cranial Nerves: No cranial nerve deficit.      Sensory: No sensory deficit.      Motor: No weakness.      Coordination: Coordination normal.      Gait: Gait normal.   Psychiatric:         Mood and Affect: Mood normal.         Behavior: Behavior normal.            ASSESSMENT:   Chacho Vences is a 69 y.o. male with unfavorable intermediate risk prostate cancer, [id number], Robert  3+4=7 (9/14 cores involved), pretreatment PSA 5.54 ng/mL. He received definitive prostate SBRT 37.5 Gy/5 fractions completed 6/23/23. He had SpaceOAR hydrogel and fiducials placed prior to treatment. He declined ADT.     His last PSA was 0.10 ng/mL on 1/9/25. He has noticed some hematospermia since 10/2024 but it is not associated with pain or increased urinary symptoms. This can occur after prostate radiation and is usually benign and self-limiting. I advised him to alert us if the blood is  increasing in frequency or amount, or if there is new associated pain. He is taking Flomax twice a day for mild LUTS. He maintains excellent performance status (ECOG 0).    He will get repeat PSA blood work in 6 months and follow up with Gerson Castillo CNP.    NCCN Guidelines were applicable to guide this patients treatment plan.    Geovany Avery MD

## 2025-02-17 ENCOUNTER — OFFICE VISIT (OUTPATIENT)
Dept: PRIMARY CARE | Facility: CLINIC | Age: 70
End: 2025-02-17
Payer: MEDICARE

## 2025-02-17 VITALS
TEMPERATURE: 97.9 F | HEART RATE: 76 BPM | OXYGEN SATURATION: 96 % | DIASTOLIC BLOOD PRESSURE: 58 MMHG | SYSTOLIC BLOOD PRESSURE: 122 MMHG

## 2025-02-17 DIAGNOSIS — R09.82 POST-NASAL DRAINAGE: ICD-10-CM

## 2025-02-17 DIAGNOSIS — H69.93 DYSFUNCTION OF BOTH EUSTACHIAN TUBES: ICD-10-CM

## 2025-02-17 DIAGNOSIS — J40 BRONCHITIS: Primary | ICD-10-CM

## 2025-02-17 PROCEDURE — 1160F RVW MEDS BY RX/DR IN RCRD: CPT | Performed by: PHYSICIAN ASSISTANT

## 2025-02-17 PROCEDURE — 3078F DIAST BP <80 MM HG: CPT | Performed by: PHYSICIAN ASSISTANT

## 2025-02-17 PROCEDURE — 1036F TOBACCO NON-USER: CPT | Performed by: PHYSICIAN ASSISTANT

## 2025-02-17 PROCEDURE — 99214 OFFICE O/P EST MOD 30 MIN: CPT | Performed by: PHYSICIAN ASSISTANT

## 2025-02-17 PROCEDURE — 1159F MED LIST DOCD IN RCRD: CPT | Performed by: PHYSICIAN ASSISTANT

## 2025-02-17 PROCEDURE — 3074F SYST BP LT 130 MM HG: CPT | Performed by: PHYSICIAN ASSISTANT

## 2025-02-17 RX ORDER — AZITHROMYCIN 250 MG/1
TABLET, FILM COATED ORAL
Qty: 6 TABLET | Refills: 0 | Status: SHIPPED | OUTPATIENT
Start: 2025-02-17 | End: 2025-02-22

## 2025-02-17 ASSESSMENT — ENCOUNTER SYMPTOMS
CHILLS: 0
COUGH: 1
FEVER: 0

## 2025-02-17 NOTE — PROGRESS NOTES
Subjective   Patient ID: Chacho Vences is a 70 y.o. male who presents for Cough (Discuss chest congestion since having cold x2 weeks. ) and Laryngitis (Discuss laryngitis by end of day xeveryday x2 weeks).    HPI   Patient c/o cough, nasal discharge. Denies fever, chills, aches, shortness of breath. Initially had some sore throat. Has PND.   Has decreased voice at times. Present for 2 week. URI symptoms improving but cough worsened since onset.   Cough: Cough is productive of greenish sputum.   Nasal discharge: Described as clear.   Denies associated diarrhea and vomiting.  Has had a little ear pressure.     Taking OTC antihistamine and Dayquil/Nyquil.     Patient denies recent known COVID exposure or international travel.     Allergies   Allergen Reactions    Lisinopril Cough        reports that he has never smoked. He has never used smokeless tobacco.    Review of Systems   Constitutional:  Negative for chills and fever.   Respiratory:  Positive for cough.        Objective   /58   Pulse 76   Temp 36.6 °C (97.9 °F)   SpO2 96%     Physical Exam  Vitals and nursing note reviewed.   Constitutional:       Appearance: Normal appearance.   HENT:      Head: Normocephalic.      Right Ear: Ear canal and external ear normal.      Left Ear: Ear canal and external ear normal.      Ears:      Comments: Increased fluid behind TM's without erythema.        Nose: Rhinorrhea present.      Right Sinus: No maxillary sinus tenderness or frontal sinus tenderness.      Left Sinus: No maxillary sinus tenderness or frontal sinus tenderness.      Mouth/Throat:      Mouth: Mucous membranes are moist.      Pharynx: No oropharyngeal exudate or posterior oropharyngeal erythema.      Comments: Has Pnd.   Eyes:      General: No scleral icterus.  Cardiovascular:      Rate and Rhythm: Normal rate and regular rhythm.   Pulmonary:      Effort: Pulmonary effort is normal.      Breath sounds: Rhonchi (mild, diffuse) present.    Lymphadenopathy:      Cervical: No cervical adenopathy.   Skin:     General: Skin is warm and dry.   Neurological:      Mental Status: He is alert.       Assessment/Plan   Diagnoses and all orders for this visit:  Bronchitis  -     azithromycin (Zithromax) 250 mg tablet; Take 2 tablets (500 mg) by mouth once daily for 1 day, THEN 1 tablet (250 mg) once daily for 4 days. Take 2 tabs (500 mg) by mouth today, than 1 daily for 4 days..  Post-nasal drainage  Dysfunction of both eustachian tubes       Rx zithromax.   Can use Mucinex DM.   Use OTC Flonase nasal spray to help PND.   Encourage fluids and rest.   Follow up if symptoms increase or persist.

## 2025-06-04 ENCOUNTER — APPOINTMENT (OUTPATIENT)
Dept: PRIMARY CARE | Facility: CLINIC | Age: 70
End: 2025-06-04
Payer: MEDICARE

## 2025-06-04 VITALS
DIASTOLIC BLOOD PRESSURE: 72 MMHG | SYSTOLIC BLOOD PRESSURE: 134 MMHG | WEIGHT: 210 LBS | TEMPERATURE: 97.8 F | BODY MASS INDEX: 31.93 KG/M2 | OXYGEN SATURATION: 94 % | HEART RATE: 91 BPM

## 2025-06-04 DIAGNOSIS — Z12.5 SCREENING FOR PROSTATE CANCER: ICD-10-CM

## 2025-06-04 DIAGNOSIS — N40.1 BENIGN PROSTATIC HYPERPLASIA WITH URINARY FREQUENCY: Primary | ICD-10-CM

## 2025-06-04 DIAGNOSIS — I10 PRIMARY HYPERTENSION: ICD-10-CM

## 2025-06-04 DIAGNOSIS — E78.2 MIXED HYPERLIPIDEMIA: ICD-10-CM

## 2025-06-04 DIAGNOSIS — R35.0 BENIGN PROSTATIC HYPERPLASIA WITH URINARY FREQUENCY: Primary | ICD-10-CM

## 2025-06-04 PROCEDURE — 1036F TOBACCO NON-USER: CPT | Performed by: FAMILY MEDICINE

## 2025-06-04 PROCEDURE — 99214 OFFICE O/P EST MOD 30 MIN: CPT | Performed by: FAMILY MEDICINE

## 2025-06-04 PROCEDURE — G2211 COMPLEX E/M VISIT ADD ON: HCPCS | Performed by: FAMILY MEDICINE

## 2025-06-04 PROCEDURE — 1159F MED LIST DOCD IN RCRD: CPT | Performed by: FAMILY MEDICINE

## 2025-06-04 PROCEDURE — 3078F DIAST BP <80 MM HG: CPT | Performed by: FAMILY MEDICINE

## 2025-06-04 PROCEDURE — 3075F SYST BP GE 130 - 139MM HG: CPT | Performed by: FAMILY MEDICINE

## 2025-06-04 RX ORDER — LOSARTAN POTASSIUM AND HYDROCHLOROTHIAZIDE 25; 100 MG/1; MG/1
0.5 TABLET ORAL 2 TIMES DAILY
Qty: 90 TABLET | Refills: 1 | Status: SHIPPED | OUTPATIENT
Start: 2025-06-04 | End: 2025-12-01

## 2025-06-04 RX ORDER — AMLODIPINE BESYLATE 10 MG/1
10 TABLET ORAL DAILY
Qty: 90 TABLET | Refills: 1 | Status: SHIPPED | OUTPATIENT
Start: 2025-06-04 | End: 2025-12-01

## 2025-06-04 ASSESSMENT — ENCOUNTER SYMPTOMS: HYPERTENSION: 1

## 2025-06-04 NOTE — PROGRESS NOTES
Subjective   Patient ID: Chacho Vences is a 70 y.o. male who presents for Hypertension and Hyperlipidemia (recheck).  Hypertension    Hyperlipidemia        HTN: well controlled  ED: needs refills of Cialis  Prostate CA: doing well after radiation.  PSA dropped significantly  BPH: doing well on flomax    Problem List[1]    Social Connections: Not on file       Medications Ordered Prior to Encounter[2]     Vitals:    06/04/25 0848   BP: 134/72   Pulse: 91   Temp: 36.6 °C (97.8 °F)   SpO2: 94%     Vitals:    06/04/25 0848   Weight: 95.3 kg (210 lb)       Review of Systems   All other systems reviewed and are negative.      Objective     Physical Exam  Constitutional:       Appearance: Normal appearance. He is well-developed.   HENT:      Head: Atraumatic.   Cardiovascular:      Rate and Rhythm: Normal rate and regular rhythm.      Heart sounds: Normal heart sounds. No murmur heard.  Pulmonary:      Effort: Pulmonary effort is normal.      Breath sounds: Normal breath sounds.   Abdominal:      General: Bowel sounds are normal.      Palpations: Abdomen is soft.   Skin:     General: Skin is warm.   Neurological:      General: No focal deficit present.      Mental Status: He is alert.   Psychiatric:         Mood and Affect: Mood normal.         No visits with results within 2 Month(s) from this visit.   Latest known visit with results is:   Lab on 01/09/2025   Component Date Value Ref Range Status    Prostate Specific AG 01/09/2025 0.10  <=4.00 ng/mL Final       Assessment/Plan   Problem List Items Addressed This Visit           ICD-10-CM    Hyperlipidemia E78.5    BPH (benign prostatic hyperplasia) - Primary N40.0    Hypertension I10    Relevant Medications    amLODIPine (Norvasc) 10 mg tablet    losartan-hydrochlorothiazide (Hyzaar) 100-25 mg tablet     Patient is doing well.  Refilled pts meds.  Follow up in 6 mo.          [1]   Patient Active Problem List  Diagnosis    Prostate cancer (Multi)    Hyperlipidemia     BPH (benign prostatic hyperplasia)    Hypertension    Osteoarthritis of left shoulder    Biceps tendinitis, left    Osteoarthritis of glenohumeral joint, left    Acute pain of left shoulder    Shoulder stiffness, left   [2]   Current Outpatient Medications on File Prior to Visit   Medication Sig Dispense Refill    cyanocobalamin (Vitamin B-12) 50 mcg tablet Take 1 tablet (50 mcg) by mouth once daily.      tadalafil (Cialis) 20 mg tablet Take 1 tablet (20 mg) by mouth once daily as needed for erectile dysfunction. 30 tablet 1    [DISCONTINUED] amLODIPine (Norvasc) 10 mg tablet Take 1 tablet (10 mg) by mouth once daily. 90 tablet 1    [DISCONTINUED] losartan-hydrochlorothiazide (Hyzaar) 100-25 mg tablet Take 0.5 tablets by mouth 2 times a day. 90 tablet 1    [DISCONTINUED] tamsulosin (Flomax) 0.4 mg 24 hr capsule Take 1 capsule (0.4 mg) by mouth 2 times a day. 180 capsule 2     No current facility-administered medications on file prior to visit.

## 2025-07-08 ENCOUNTER — LAB (OUTPATIENT)
Dept: LAB | Facility: HOSPITAL | Age: 70
End: 2025-07-08
Payer: MEDICARE

## 2025-07-08 DIAGNOSIS — C61 PROSTATE CANCER (MULTI): ICD-10-CM

## 2025-07-08 DIAGNOSIS — C61 MALIGNANT NEOPLASM OF PROSTATE (MULTI): Primary | ICD-10-CM

## 2025-07-08 LAB — PSA SERPL-MCNC: <0.1 NG/ML

## 2025-07-08 PROCEDURE — 84153 ASSAY OF PSA TOTAL: CPT

## 2025-07-08 PROCEDURE — 36415 COLL VENOUS BLD VENIPUNCTURE: CPT

## 2025-07-09 ENCOUNTER — TELEPHONE (OUTPATIENT)
Dept: RADIATION ONCOLOGY | Facility: HOSPITAL | Age: 70
End: 2025-07-09
Payer: MEDICARE

## 2025-07-09 NOTE — PROGRESS NOTES
Cancer synopsis:  Rad/onc: Dr. Jeong/Anna BRISCOE    71 y/o male w/ unfavorable intermediate risk prostate cancer, [id number], [place]  3+4=7 (9/14 cores involved), pretreatment PSA 5.54 ng/mL.     He received definitive prostate SBRT 37.5 Gy/5 fractions completed 6/23/23. He declined ADT.     History of presenting illness:    Patient ID: [id number]     Chacho Vences is a 70 y.o. male who presents for  unfavorable intermediate risk prostate cancer, [id number], Robert  3+4=7 (9/14 cores involved), pretreatment PSA 5.54 ng/mL. He received definitive prostate SBRT 37.5 Gy/5 fractions completed 6/23/23. He had SpaceOAR hydrogel and fiducials placed prior to treatment. He declined ADT.    RT Site: prostate  RT Date: 06/23/2023  Hormone therapy: No  Hot Flushes: Denies  Fatigue: Denies  Bone pain: Denies  ED: Denies changes since Rt, states is fine at this time  - Quality of erections during the last 4 weeks: 3 = Firm enough for masturbation and foreplay only  - Use of erectile dysfunction medications:  Cialis 10-20mg  IPSS: assigned to pt to complete  Urinary symptoms: Denies leakage, dysuria, hematuria, frequency and or urgency.   Urinary Medications: No  Rectal bleeding: Denies  Colonoscopy: 05/2023 negative  Other systems: Denies SOB, CP or fever    Review of systems:  Review of Systems   Constitutional:  Negative for fatigue, fever and unexpected weight change.   Respiratory:  Negative for cough, chest tightness and shortness of breath.    Cardiovascular:  Negative for chest pain, palpitations and leg swelling.   Gastrointestinal:  Negative for abdominal pain, anal bleeding, blood in stool, constipation, diarrhea and rectal pain.   Endocrine: Negative for cold intolerance, heat intolerance and polyuria.   Genitourinary:  Negative for decreased urine volume, difficulty urinating, dysuria, frequency, hematuria and urgency.   Musculoskeletal:  Negative for arthralgias, back pain, gait problem and joint swelling.    Skin: Negative.    Allergic/Immunologic: Negative.    Neurological:  Negative for dizziness, syncope and weakness.   Psychiatric/Behavioral: Negative.         PERFORMANCE STATUS:  KPS/ECO, Fully active, able to carry on all pre-disease performed without restriction (ECOG equivalent 0)    Past Medical history  Medical History[1]     Surgical/family history  Family History[2]   Surgical History[3]     Social History  Tobacco Use: Low Risk  (2025)    Patient History     Smoking Tobacco Use: Never     Smokeless Tobacco Use: Never     Passive Exposure: Not on file       Current med list:  Current Outpatient Medications   Medication Instructions    amLODIPine (NORVASC) 10 mg, oral, Daily    cyanocobalamin (VITAMIN B-12) 50 mcg, Daily    losartan-hydrochlorothiazide (Hyzaar) 100-25 mg tablet 0.5 tablets, oral, 2 times daily    tadalafil 20 mg, oral, Daily PRN      Last recorded vital:  Virtual    Physical exam  Virtual    Pertinent labs:  Prostate Specific AG   Date/Time Value Ref Range Status   2025 09:34 AM <0.10 <=4.00 ng/mL Final     Dx:  Problem List Items Addressed This Visit       Prostate cancer (Multi)    Relevant Orders    Clinic Appointment Request (Completed)     Other Visit Diagnoses         Malignant neoplasm of prostate (Multi)    -  Primary        PSA of <0.10 was reviewed and is undectable. Review of latent SE including rectal bleeding, hematuria, urinary strictures, ED where reviewed as well as how to contact office if s/s present. Denies latent SE. NCCN guidelines where reviewed and routine FUV of every 3m for first year and every 6m for four years for a total of five years was discussed. Patient verbalized understanding.      PLAN:  FUV 6m  Labs Psa in 6m  Imaging none  FUV other providers: PCP for routine evals    Please contact office with any concerns:  Gerson Swann [facility]  [phone]    I performed this visit using realtime telehealth tools, including an audio/video OR  telephone connection between patient’s name and location Miguel Vences AOCNP.    2. POS 10: Telehealth provided in patient's home.  o Patient is located in their home (which is a location other than a hospital or other  facility where the patient receives care in a private residence) when receiving  health services or health related services through telecommunication technology.       [1]   Past Medical History:  Diagnosis Date    Abnormal finding of blood chemistry, unspecified 10/08/2015    Abnormal blood chemistry    Arthritis 04/2023    Cancer (Multi) 03/9/2023    HL (hearing loss) 01/2022    Hypertension O4/2021   [2]   Family History  Problem Relation Name Age of Onset    Hearing loss Mother Anjelica Denarelli     Hypertension Mother Anjelica Denarelli     Vision loss Mother Anjelica Spontarelli     Miscarriages / Stillbirths Other Tati Ru    [3]   Past Surgical History:  Procedure Laterality Date    CIRCUMCISION, PRIMARY  1955    HERNIA REPAIR  1960, 1983    OTHER SURGICAL HISTORY  07/20/2022    Inguinal hernia repair    OTHER SURGICAL HISTORY  07/20/2022    Nasal septoplasty    OTHER SURGICAL HISTORY  07/20/2022    Tonsillectomy    VASECTOMY  1990

## 2025-07-10 ENCOUNTER — HOSPITAL ENCOUNTER (OUTPATIENT)
Dept: RADIATION ONCOLOGY | Facility: HOSPITAL | Age: 70
Setting detail: RADIATION/ONCOLOGY SERIES
Discharge: HOME | End: 2025-07-10
Payer: MEDICARE

## 2025-07-10 DIAGNOSIS — C61 PROSTATE CANCER (MULTI): ICD-10-CM

## 2025-07-10 DIAGNOSIS — C61 MALIGNANT NEOPLASM OF PROSTATE (MULTI): Primary | ICD-10-CM

## 2025-07-10 PROCEDURE — 99213 OFFICE O/P EST LOW 20 MIN: CPT | Mod: 95

## 2025-07-10 PROCEDURE — 99213 OFFICE O/P EST LOW 20 MIN: CPT

## 2025-07-10 ASSESSMENT — ENCOUNTER SYMPTOMS
ANAL BLEEDING: 0
DYSURIA: 0
DIFFICULTY URINATING: 0
ABDOMINAL PAIN: 0
COUGH: 0
CONSTIPATION: 0
BLOOD IN STOOL: 0
PSYCHIATRIC NEGATIVE: 1
FREQUENCY: 0
JOINT SWELLING: 0
SHORTNESS OF BREATH: 0
RECTAL PAIN: 0
CHEST TIGHTNESS: 0
PALPITATIONS: 0
HEMATURIA: 0
UNEXPECTED WEIGHT CHANGE: 0
DIARRHEA: 0
FEVER: 0
BACK PAIN: 0
ARTHRALGIAS: 0
WEAKNESS: 0
ALLERGIC/IMMUNOLOGIC NEGATIVE: 1
DIZZINESS: 0
FATIGUE: 0

## 2025-07-21 ENCOUNTER — APPOINTMENT (OUTPATIENT)
Dept: ORTHOPEDIC SURGERY | Facility: CLINIC | Age: 70
End: 2025-07-21
Payer: MEDICARE

## 2025-07-28 ENCOUNTER — APPOINTMENT (OUTPATIENT)
Dept: ORTHOPEDIC SURGERY | Facility: CLINIC | Age: 70
End: 2025-07-28
Payer: MEDICARE

## 2025-07-28 ENCOUNTER — HOSPITAL ENCOUNTER (OUTPATIENT)
Dept: RADIOLOGY | Facility: CLINIC | Age: 70
Discharge: HOME | End: 2025-07-28
Payer: MEDICARE

## 2025-07-28 VITALS — WEIGHT: 208 LBS | HEIGHT: 68 IN | BODY MASS INDEX: 31.52 KG/M2

## 2025-07-28 DIAGNOSIS — Z96.612 STATUS POST TOTAL SHOULDER ARTHROPLASTY, LEFT: ICD-10-CM

## 2025-07-28 DIAGNOSIS — S29.012A RHOMBOID MUSCLE STRAIN, INITIAL ENCOUNTER: Primary | ICD-10-CM

## 2025-07-28 PROCEDURE — 1159F MED LIST DOCD IN RCRD: CPT | Performed by: STUDENT IN AN ORGANIZED HEALTH CARE EDUCATION/TRAINING PROGRAM

## 2025-07-28 PROCEDURE — 99213 OFFICE O/P EST LOW 20 MIN: CPT | Performed by: STUDENT IN AN ORGANIZED HEALTH CARE EDUCATION/TRAINING PROGRAM

## 2025-07-28 PROCEDURE — 1160F RVW MEDS BY RX/DR IN RCRD: CPT | Performed by: STUDENT IN AN ORGANIZED HEALTH CARE EDUCATION/TRAINING PROGRAM

## 2025-07-28 PROCEDURE — 73030 X-RAY EXAM OF SHOULDER: CPT | Mod: LEFT SIDE | Performed by: RADIOLOGY

## 2025-07-28 PROCEDURE — 3008F BODY MASS INDEX DOCD: CPT | Performed by: STUDENT IN AN ORGANIZED HEALTH CARE EDUCATION/TRAINING PROGRAM

## 2025-07-28 PROCEDURE — 1036F TOBACCO NON-USER: CPT | Performed by: STUDENT IN AN ORGANIZED HEALTH CARE EDUCATION/TRAINING PROGRAM

## 2025-07-28 PROCEDURE — 73030 X-RAY EXAM OF SHOULDER: CPT | Mod: LT

## 2025-07-28 ASSESSMENT — PAIN - FUNCTIONAL ASSESSMENT: PAIN_FUNCTIONAL_ASSESSMENT: NO/DENIES PAIN

## 2025-07-28 NOTE — PROGRESS NOTES
PRIMARY CARE PHYSICIAN: Farhat Jones MD    ORTHOPAEDIC POSTOPERATIVE VISIT    ASSESSMENT & PLAN    Impression: 70 y.o. male 1 year postop s/p left anatomic total shoulder arthroplasty, biceps tenodesis done 7/16/2024.      Plan:   Overall Chacho is doing very well.  He is having some periscapular pain related to rhomboid muscle tightness and soreness.  I believe he would benefit from a round of physical therapy.  He was divided with a new referral to physical therapy at this time to work on shoulder range of motion, scapular stabilization, he will continue to work with physical therapy through the postoperative protocol for the above.  He can progressively return to golf.  We discussed his post-operative precautions and he expressed understanding. Patient will follow up at the 1 year rajan for his annual postoperative visit.    I reviewed their postoperative timeline and plan with them. They understand the postoperative precautions and the treatment plan going forward.     Follow-Up: Patient will follow-up for his annual postoperative visit with x-rays left shoulder    At the end of the visit, all questions were answered in full. The patient is in agreement with the plan and recommendations. They will call the office with any questions/concerns.      Note dictated with adsquare software. Completed without full typed error editing and sent to avoid delay.      SUBJECTIVE  CHIEF COMPLAINT:   Chief Complaint   Patient presents with    Left Shoulder - Post-op        HPI: Chacho Vences is a 70 y.o. patient who is now 1 year postop status post left anatomic total shoulder arthroplasty, biceps tenodesis, done 7/16/2024. X-rays updated today. Chacho is doing well. He does report some posterior shoulder tightness. No issues or concerns.       REVIEW OF SYSTEMS  Constitutional: See HPI for pain assessment, No significant weight loss, recent trauma  Cardiovascular: No chest pain, shortness of  "breath  Respiratory: No difficulty breathing, cough  Gastrointestinal: No nausea, vomiting, diarrhea, constipation  Musculoskeletal: Noted in HPI, positive for pain, restricted motion, stiffness  Integumentary: No rashes, easy bruising, redness   Neurological: no numbness or tingling in extremities, no gait disturbances   Psychiatric: No mood changes, memory changes, social issues  Heme/Lymph: no excessive swelling, easy bruising, excessive bleeding  ENT: No hearing changes  Eyes: No vision changes    CURRENT MEDICATIONS:   Current Outpatient Medications   Medication Sig Dispense Refill    amLODIPine (Norvasc) 10 mg tablet Take 1 tablet (10 mg) by mouth once daily. 90 tablet 1    cyanocobalamin (Vitamin B-12) 50 mcg tablet Take 1 tablet (50 mcg) by mouth once daily.      losartan-hydrochlorothiazide (Hyzaar) 100-25 mg tablet Take 0.5 tablets by mouth 2 times a day. 90 tablet 1    tadalafil (Cialis) 20 mg tablet Take 1 tablet (20 mg) by mouth once daily as needed for erectile dysfunction. 30 tablet 1     No current facility-administered medications for this visit.        OBJECTIVE    PHYSICAL EXAM      7/16/2024    11:34 AM 7/29/2024     9:52 AM 9/30/2024     8:19 AM 12/11/2024    10:44 AM 1/10/2025     9:31 AM 2/17/2025     9:50 AM 6/4/2025     8:48 AM   Vitals   Systolic 124   124 130 122 134   Diastolic 67   72 71 58 72   Heart Rate 71   75 76 76 91   Temp 36.6 °C (97.9 °F)   36.2 °C (97.1 °F) 36.1 °C (97 °F) 36.6 °C (97.9 °F) 36.6 °C (97.8 °F)   Resp 18    16     Height  1.727 m (5' 8\") 1.727 m (5' 8\") 1.727 m (5' 8\")      Weight (lb)  205 205  211.1  210   BMI  31.17 kg/m2 31.17 kg/m2 31.17 kg/m2 32.1 kg/m2  31.93 kg/m2   BSA (m2)  2.11 m2 2.11 m2 2.11 m2 2.14 m2  2.14 m2   Visit Report  Report Report Report  Report Report      There is no height or weight on file to calculate BMI.    General: Well-appearing, no acute distress    Skin intact bilateral upper and lower extremities  No erythema  No " warmth    Detailed examination of the left shoulder demonstrates:  Surgical incision well healed  No erythema or warmth  No ecchymosis or soft tissue swelling  Biceps contour normal  Shoulder range of motion: Forward flexion 150  Good resistance with Jobes, ER, BP testing  Upper extremity motor grossly intact  C5-T1 sensation intact bilaterally  2+ radial pulses bilaterally  Warm and well-perfused, brisk capillary refill    IMAGING: X-ray views of the left shoulder reviewed by me demonstrate anatomic total shoulder arthroplasty in place without evidence of implant loosening or hardware complication, no dislocation.

## 2025-08-12 ENCOUNTER — APPOINTMENT (OUTPATIENT)
Dept: PRIMARY CARE | Facility: CLINIC | Age: 70
End: 2025-08-12
Payer: MEDICARE

## 2025-08-13 ENCOUNTER — OFFICE VISIT (OUTPATIENT)
Dept: PRIMARY CARE | Facility: CLINIC | Age: 70
End: 2025-08-13
Payer: MEDICARE

## 2025-08-13 VITALS
SYSTOLIC BLOOD PRESSURE: 144 MMHG | WEIGHT: 212 LBS | TEMPERATURE: 97.3 F | OXYGEN SATURATION: 98 % | BODY MASS INDEX: 32.23 KG/M2 | DIASTOLIC BLOOD PRESSURE: 70 MMHG | HEART RATE: 73 BPM

## 2025-08-13 DIAGNOSIS — H10.9 BACTERIAL CONJUNCTIVITIS: Primary | ICD-10-CM

## 2025-08-13 PROCEDURE — G2211 COMPLEX E/M VISIT ADD ON: HCPCS | Performed by: FAMILY MEDICINE

## 2025-08-13 PROCEDURE — 3077F SYST BP >= 140 MM HG: CPT | Performed by: FAMILY MEDICINE

## 2025-08-13 PROCEDURE — 99213 OFFICE O/P EST LOW 20 MIN: CPT | Performed by: FAMILY MEDICINE

## 2025-08-13 PROCEDURE — 1159F MED LIST DOCD IN RCRD: CPT | Performed by: FAMILY MEDICINE

## 2025-08-13 PROCEDURE — 3078F DIAST BP <80 MM HG: CPT | Performed by: FAMILY MEDICINE

## 2025-08-13 RX ORDER — POLYMYXIN B SULFATE AND TRIMETHOPRIM 1; 10000 MG/ML; [USP'U]/ML
1 SOLUTION OPHTHALMIC
Qty: 10 ML | Refills: 0 | Status: SHIPPED | OUTPATIENT
Start: 2025-08-13 | End: 2025-08-20

## 2025-08-13 ASSESSMENT — ENCOUNTER SYMPTOMS
PSYCHIATRIC NEGATIVE: 1
SINUS PRESSURE: 0
RESPIRATORY NEGATIVE: 1
WHEEZING: 0
RHINORRHEA: 0
SORE THROAT: 0
SINUS PAIN: 0
EYE ITCHING: 0
MUSCULOSKELETAL NEGATIVE: 1
FEVER: 0
PHOTOPHOBIA: 0
EYE DISCHARGE: 1
EYE REDNESS: 1
EYE PAIN: 0
GASTROINTESTINAL NEGATIVE: 1
FACIAL SWELLING: 0
SHORTNESS OF BREATH: 0
ENDOCRINE NEGATIVE: 1
CARDIOVASCULAR NEGATIVE: 1

## 2025-09-02 ENCOUNTER — EVALUATION (OUTPATIENT)
Dept: PHYSICAL THERAPY | Facility: CLINIC | Age: 70
End: 2025-09-02
Payer: MEDICARE

## 2025-09-02 DIAGNOSIS — S29.012D RHOMBOID MUSCLE STRAIN, SUBSEQUENT ENCOUNTER: ICD-10-CM

## 2025-09-02 DIAGNOSIS — Z96.612 HX OF TOTAL SHOULDER REPLACEMENT, LEFT: Primary | ICD-10-CM

## 2025-09-02 PROBLEM — Z96.611 S/P REVERSE TOTAL SHOULDER ARTHROPLASTY, RIGHT: Status: RESOLVED | Noted: 2025-09-02 | Resolved: 2025-09-02

## 2025-09-02 PROBLEM — Z96.611 S/P REVERSE TOTAL SHOULDER ARTHROPLASTY, RIGHT: Status: ACTIVE | Noted: 2025-09-02

## 2025-09-02 PROCEDURE — 97161 PT EVAL LOW COMPLEX 20 MIN: CPT | Mod: GP | Performed by: PHYSICAL THERAPIST

## 2025-09-02 PROCEDURE — 97110 THERAPEUTIC EXERCISES: CPT | Mod: GP | Performed by: PHYSICAL THERAPIST

## 2025-09-02 ASSESSMENT — ENCOUNTER SYMPTOMS
OCCASIONAL FEELINGS OF UNSTEADINESS: 0
LOSS OF SENSATION IN FEET: 0
DEPRESSION: 0

## 2025-09-04 ENCOUNTER — HOSPITAL ENCOUNTER (OUTPATIENT)
Dept: RADIOLOGY | Facility: HOSPITAL | Age: 70
Discharge: HOME | End: 2025-09-04
Payer: MEDICARE

## 2025-09-04 ENCOUNTER — APPOINTMENT (OUTPATIENT)
Dept: VASCULAR MEDICINE | Facility: HOSPITAL | Age: 70
End: 2025-09-04
Payer: MEDICARE

## 2025-09-04 ENCOUNTER — APPOINTMENT (OUTPATIENT)
Dept: PRIMARY CARE | Facility: CLINIC | Age: 70
End: 2025-09-04
Payer: MEDICARE

## 2025-09-04 DIAGNOSIS — M79.89 LEG SWELLING: ICD-10-CM

## 2025-09-04 PROCEDURE — 93971 EXTREMITY STUDY: CPT

## 2025-09-04 ASSESSMENT — ENCOUNTER SYMPTOMS: LEG PAIN: 1

## 2025-12-15 ENCOUNTER — APPOINTMENT (OUTPATIENT)
Dept: PRIMARY CARE | Facility: CLINIC | Age: 70
End: 2025-12-15
Payer: MEDICARE

## (undated) DEVICE — GLOVE, SURGICAL, PROTEXIS PI , 7.5, PF, LF

## (undated) DEVICE — Device

## (undated) DEVICE — BANDAGE, COFLEX, 4 X 5 YDS, TAN, STERILE, LF

## (undated) DEVICE — GLOVE, PROTEXIS PI CLASSIC, SZ-6.5, PF, LF

## (undated) DEVICE — SYRINGE, 60 CC, IRRIGATION, TOOMEY TIP

## (undated) DEVICE — BLOCK, FOAM, NEEDLE POP -N-COUNT, 1840, BLACK

## (undated) DEVICE — IRRIGATION SYSTEM, WOUND, PULSAVAC PLUS

## (undated) DEVICE — SUTURE, NICELOOP, SIZE-5, NONABSORB, W/NEEDLE GREEN

## (undated) DEVICE — SUTURE, CTD, VICRYL, 2-0, UND, BR, CT-2

## (undated) DEVICE — SUTURE, MONOCRYL, 3-0, 27 IN, PS-2, UNDYED

## (undated) DEVICE — PENCIL, ELECTROSURG, W/BUTTON SWITCH & HOLSTER, EZ CLEAN, DISP

## (undated) DEVICE — ADHESIVE, DERMABOND, PRINEO, 12 X 9, STERILE

## (undated) DEVICE — SUTURE, NICELOOP, SIZE-5, NONABSORB, W/NEEDLE WHITE

## (undated) DEVICE — CEMENT, MIXEVAC III, 10S BOWL, KNEES

## (undated) DEVICE — MASK, FACE, TENET, FOAM POSITIONING, DISPOSABLE

## (undated) DEVICE — AEQUALIS PERFORM + 2.5 GUIDEWIRE ***DUPLICATE, PLEASE TRANSITION TO CAT # DWD017

## (undated) DEVICE — SOLUTION, IRRIGATION, X RX SODIUM CHL 0.9%, 1000ML BTL

## (undated) DEVICE — BLANKET, LOWER BODY, VHA PLUS, ADULT

## (undated) DEVICE — GLOVE, SURGICAL, PROTEXIS PI BLUE W/NEUTHERA, 8.0, PF, LF

## (undated) DEVICE — DRAPE, INCISE, ANTIMICROBIAL, IOBAN 2, LARGE, 30 X 32 IN

## (undated) DEVICE — SUTURE, VICRYL 0, 36 IN, CT-1, VIOLET

## (undated) DEVICE — BLADE, SAW, SAGITTAL, 25.0 X 1.27 X 90MM

## (undated) DEVICE — GLOVE, SURGICAL, PROTEXIS PI BLUE W/NEUTHERA, 6.5, PF, LF

## (undated) DEVICE — DRAPE, INSTRUMENT, W/POUCH, STERI DRAPE, 7 X 11 IN, DISPOSABLE, STERILE